# Patient Record
Sex: MALE | Race: BLACK OR AFRICAN AMERICAN | NOT HISPANIC OR LATINO | Employment: FULL TIME | URBAN - METROPOLITAN AREA
[De-identification: names, ages, dates, MRNs, and addresses within clinical notes are randomized per-mention and may not be internally consistent; named-entity substitution may affect disease eponyms.]

---

## 2017-02-02 ENCOUNTER — OFFICE VISIT (OUTPATIENT)
Dept: SURGERY | Facility: CLINIC | Age: 57
End: 2017-02-02
Payer: COMMERCIAL

## 2017-02-02 VITALS
BODY MASS INDEX: 21.19 KG/M2 | SYSTOLIC BLOOD PRESSURE: 134 MMHG | WEIGHT: 143.5 LBS | DIASTOLIC BLOOD PRESSURE: 77 MMHG | HEART RATE: 61 BPM

## 2017-02-02 DIAGNOSIS — K60.2 FISSURE IN ANO: Primary | ICD-10-CM

## 2017-02-02 PROCEDURE — 99999 PR PBB SHADOW E&M-EST. PATIENT-LVL III: CPT | Mod: PBBFAC,,, | Performed by: NURSE PRACTITIONER

## 2017-02-02 PROCEDURE — 99213 OFFICE O/P EST LOW 20 MIN: CPT | Mod: S$GLB,,, | Performed by: NURSE PRACTITIONER

## 2017-02-02 RX ORDER — HYDROCORTISONE ACETATE 25 MG/1
25 SUPPOSITORY RECTAL 2 TIMES DAILY
Qty: 20 SUPPOSITORY | Refills: 0 | Status: SHIPPED | OUTPATIENT
Start: 2017-02-02 | End: 2017-02-12

## 2017-02-02 RX ORDER — PRAMOXINE HYDROCHLORIDE HYDROCORTISONE ACETATE 100; 100 MG/10G; MG/10G
AEROSOL, FOAM TOPICAL
Refills: 2 | COMMUNITY
Start: 2017-01-17 | End: 2021-10-12

## 2017-02-02 NOTE — PATIENT INSTRUCTIONS
Anal Fissure (Child)    The anal canal is the end portion of the intestinal tract. It includes the rectum and anus. Stool is passed through the anus. Sometimes a crack or tear develops in the lining of the anal canal. This condition is called an anal fissure.  Anal fissures are caused by trauma or stretching of the anal canal. This is most often due to constipation (having hard, oedoljaub-bs-jjhn stools). Severe diarrhea or insertion of an object into the anal canal may also cause a fissure.  Symptoms include pain and bleeding, especially during a bowel movement. Sometimes there is swelling, itching, and skin irritation. The area may spasm, causing more pain and skin separation. The most common complication is infection, which may lead to an abscess (pocket of pus). When this happens, there may also be discharge from the fissure.  An anal fissure usually heals on its own with no special treatment. Home care instructions to help prevent constipation and relieve symptoms may be given. Once the area has healed, follow-up tests may be needed.  In some cases, a fissure does not heal on its own. Surgery may then be needed to close the tear.  Home care  Medicines  Your child may be prescribed medicines, such as pain relievers, stool softeners, or laxatives. Make sure to follow all instructions when giving any of these medicines to your child. Note: Do not give your child over-the-counter medicines without talking to the provider first.  General care  · To help ease constipation, you may be told to:  ¨ Increase the fiber in your childs diet. This includes foods such as whole grains, fresh fruits, and vegetables. Fiber adds bulk to stool and absorbs water to soften stool. This helps stool pass through the colon more easily. If needed, a fiber supplement may also be prescribed.  ¨ Encourage your child to drink lots of water. This can also help soften stool.  · To help relieve pain and relax the muscles in the anus, have  your child soak in a bath with a few inches of warm water. This is a called a sitz bath. Your child should do this for 10 to 15 minutes at time, a few times a day, or as advised.  · Keep a careful record of when your child has a bowel movement and the type of stool that was passed. This may help the provider determine future care for your child. Older children should be encouraged to keep their own record.  · Check your childs anus for bleeding or signs of infection (see below). Older children may be asked to help monitor their own symptoms.  Follow-up care  Follow up with your childs healthcare provider as advised. If testing was done, youll be told the results and any new findings that may affect your childs care.  When to seek medical advice  Unless your childs healthcare provider advises otherwise, call the provider right away if:  · Your child is 3 months old or younger and has a fever of 100.4°F (38°C) or higher. (Seek treatment right away. Fever in a young baby can be a sign of a serious infection.)  · Your child is younger than 2 years of age and has a fever of 100.4°F (38°C) that lasts for more than 1 day.  · Your child is 2 years old or older and has a fever of 100.4°F (38°C) that lasts for more than 3 days.  · Your child has repeated fevers above 104°F (40°C).  Also call the provider right away if:  · Your child symptoms worsen, or they dont improve with home care measures.  · Your child has signs of infection such as increased redness, swelling, or foul-smelling drainage in the area around the fissure.  · Your child has ongoing constipation or explosive diarrhea.  Call 911  Call 911 right away if:  · Your child has trouble breathing.  · Your child has trouble swallowing.  · Your child faints.  · Your child has an unusually fast heart rate.  · Your child has large amounts of bleeding from the anus or blood in the stool.  © 7283-1318 [employer]. [address] [place] [address], [place]  [address]. All rights reserved. This information is not intended as a substitute for professional medical care. Always follow your healthcare professional's instructions.

## 2017-02-02 NOTE — MR AVS SNAPSHOT
[facility]  [address]  [facility] [address]  Phone: [phone]                  Bob Trevino Jr.   2017 2:00 PM   Office Visit    Description:  Male : 1960   Provider:  Debo Lakhani NP   Department:  [facility]           Reason for Visit     Rectal Pain                To Do List           Goals (5 Years of Data)     None       These Medications        Disp Refills Start End    hydrocortisone (ANUSOL-HC) 25 mg suppository 20 suppository 0 2017    Place 1 suppository (25 mg total) rectally 2 (two) times daily. - Rectal      [facility]     [facility] Care [facility] -  Assistance  Registered nurses in the [facility] provide clinical advisement, health education, appointment booking, and other advisory services.  Call for this free service at [phone].             Medications           Message regarding Medications     Verify the changes and/or additions to your medication regime listed below are the same as discussed with your clinician today.  If any of these changes or additions are incorrect, please notify your healthcare provider.        START taking these NEW medications        Refills    hydrocortisone (ANUSOL-HC) 25 mg suppository 0    Sig: Place 1 suppository (25 mg total) rectally 2 (two) times daily.    Class: Print    Route: Rectal           Verify that the below list of medications is an accurate representation of the medications you are currently taking.  If none reported, the list may be blank. If incorrect, please contact your healthcare provider. Carry this list with you in case of emergency.           Current Medications     docusate sodium (COLACE) 100 MG capsule Take 1 capsule (100 mg total) by mouth 2 (two) times daily.    hydrocortisone (ANUSOL-HC) 25 mg suppository Place 1 suppository (25 mg total) rectally 2 (two) times daily.    hydrocortisone (PROCTOCORT) 10 % (80  mg) rectal foam Place 1 applicator rectally 2 (two) times daily.    ibuprofen (ADVIL,MOTRIN) 600 MG tablet Take 1 tablet (600 mg total) by mouth every 6 (six) hours as needed for Pain.    PROCTOFOAM HC rectal foam PLACE 1 APPLICATOR RECTALLY BID           Clinical Reference Information           Your Vitals Were     BP Pulse Weight BMI       134/77 61 65.1 kg (143 lb 8.3 oz) 21.19 kg/m2       Blood Pressure          Most Recent Value    BP  134/77      Allergies as of 2/2/2017     No Known Allergies      Immunizations Administered on Date of Encounter - 2/2/2017     None      [facility] Sign-Up     Activating your [facility] account is as easy as 1-2-3!     1) Visit [email or web address], select Sign Up Now, enter this activation code and your date of birth, then select Next.  [email or web address]  Expires: 3/19/2017  2:35 PM      2) Create a username and password to use when you visit [facility] in the future and select a security question in case you lose your password and select Next.    3) Enter your e-mail address and click Sign Up!    Additional Information  If you have questions, please e-mail [email or web address] or call [phone] to talk to our [facility] staff. Remember, [facility] is NOT to be used for urgent needs. For medical emergencies, dial 911.         Instructions      Anal Fissure (Child)    The anal canal is the end portion of the intestinal tract. It includes the rectum and anus. Stool is passed through the anus. Sometimes a crack or tear develops in the lining of the anal canal. This condition is called an anal fissure.  Anal fissures are caused by trauma or stretching of the anal canal. This is most often due to constipation (having hard, adrrrguii-ng-puql stools). Severe diarrhea or insertion of an object into the anal canal may also cause a fissure.  Symptoms include pain and bleeding, especially during a bowel movement. Sometimes there is swelling, itching, and skin irritation. The area may spasm,  causing more pain and skin separation. The most common complication is infection, which may lead to an abscess (pocket of pus). When this happens, there may also be discharge from the fissure.  An anal fissure usually heals on its own with no special treatment. Home care instructions to help prevent constipation and relieve symptoms may be given. Once the area has healed, follow-up tests may be needed.  In some cases, a fissure does not heal on its own. Surgery may then be needed to close the tear.  Home care  Medicines  Your child may be prescribed medicines, such as pain relievers, stool softeners, or laxatives. Make sure to follow all instructions when giving any of these medicines to your child. Note: Do not give your child over-the-counter medicines without talking to the provider first.  General care  · To help ease constipation, you may be told to:  ¨ Increase the fiber in your childs diet. This includes foods such as whole grains, fresh fruits, and vegetables. Fiber adds bulk to stool and absorbs water to soften stool. This helps stool pass through the colon more easily. If needed, a fiber supplement may also be prescribed.  ¨ Encourage your child to drink lots of water. This can also help soften stool.  · To help relieve pain and relax the muscles in the anus, have your child soak in a bath with a few inches of warm water. This is a called a sitz bath. Your child should do this for 10 to 15 minutes at time, a few times a day, or as advised.  · Keep a careful record of when your child has a bowel movement and the type of stool that was passed. This may help the provider determine future care for your child. Older children should be encouraged to keep their own record.  · Check your childs anus for bleeding or signs of infection (see below). Older children may be asked to help monitor their own symptoms.  Follow-up care  Follow up with your childs healthcare provider as advised. If testing was done, youll  be told the results and any new findings that may affect your childs care.  When to seek medical advice  Unless your childs healthcare provider advises otherwise, call the provider right away if:  · Your child is 3 months old or younger and has a fever of 100.4°F (38°C) or higher. (Seek treatment right away. Fever in a young baby can be a sign of a serious infection.)  · Your child is younger than 2 years of age and has a fever of 100.4°F (38°C) that lasts for more than 1 day.  · Your child is 2 years old or older and has a fever of 100.4°F (38°C) that lasts for more than 3 days.  · Your child has repeated fevers above 104°F (40°C).  Also call the provider right away if:  · Your child symptoms worsen, or they dont improve with home care measures.  · Your child has signs of infection such as increased redness, swelling, or foul-smelling drainage in the area around the fissure.  · Your child has ongoing constipation or explosive diarrhea.  Call 911  Call 911 right away if:  · Your child has trouble breathing.  · Your child has trouble swallowing.  · Your child faints.  · Your child has an unusually fast heart rate.  · Your child has large amounts of bleeding from the anus or blood in the stool.  © 2026-5537 [employer]. [address] [place] [address], [place] [address]. All rights reserved. This information is not intended as a substitute for professional medical care. Always follow your healthcare professional's instructions.             Language Assistance Services     ATTENTION: Language assistance services are available, free of charge. Please call [phone].      ATENCIÓN: Si habla español, tiene a rothman disposición servicios gratuitos de asistencia lingüística. Llame al [phone].     ANDREW Ý: N?u b?n nói Ti?ng Vi?t, có các d?ch v? h? tr? ngôn ng? mi?n phí dành cho b?n. G?i s? [phone].         [facility] complies with applicable Federal civil rights laws and does not  discriminate on the basis of race, color, national origin, age, disability, or sex.

## 2017-02-07 NOTE — PROGRESS NOTES
Patient ID:  Bob Trevino Jr. is a 56 y.o. male     Chief Complaint:   Chief Complaint   Patient presents with    Rectal Pain        HPI:  Pt presents to [facility] with complaints of rectal pain with bm for several weeks, was in ER few months ago with similar complaint and the pain did resolve, normally has hard stools every other day, neve had colonoscopy, no hx of colon or rectal surgery and no hx of colon rectal cancer       ROS:        Constitutional: No fever, chills, activity or appetite change.      HENT: No hearing loss, facial swelling, neck pain or stiffness.       Eyes: No discharge, itching and visual disturbance.      Respiratory: No apnea, cough, choking or shortness of breath.       Cardiovascular: No leg swelling or chest pain      Gastrointestinal: No abdominal distention or change in bowel habbits     Genitourinary: No dysuria, frequency or flank pain.      Musculoskeletal: No arthralgias or gait problem.      Neurological: No dizziness, seizures or weakness.      Hematological: No adenopathy.      Psychiatric/Behavioral: No hallucinations or behavioral problems.       PE:    APPEARANCE: Well nourished, well developed, in no acute distress.   CHEST: Lungs clear. Normal respiratory effort.  CARDIOVASCULAR: Normal rhythm. No edema.  ABDOMEN: Soft. No tenderness or masses.  Rectum:  Careful eversion of anus revealed posterior anal fissure inside of anus     Musculoskeletal: Symmetric, normal range of motion and strength.   Neurological: Alert and oriented to person, place, and time. Normal reflexes.   Skin: Skin is warm and dry.   Psychiatric: Normal mood and affect. Behavior is normal and appropriate.     Impression:    fissure    PLAN:    ·  Sitz baths (water hot as tolerable, 10-20min, 4-5x A day)  ·  Stool softeners (metamucil, citrucel, konsyl, fibercon, benefiber)  ·  Miralax if stool is still hard  ·  Increase dietary water intake  ·  Increase exercise or ambulation  If no better 2-3 weeks made fu  appt

## 2021-01-25 ENCOUNTER — CLINICAL SUPPORT (OUTPATIENT)
Dept: URGENT CARE | Facility: CLINIC | Age: 61
End: 2021-01-25
Payer: COMMERCIAL

## 2021-01-25 DIAGNOSIS — Z20.822 EXPOSURE TO COVID-19 VIRUS: Primary | ICD-10-CM

## 2021-01-25 LAB
CTP QC/QA: YES
SARS-COV-2 RDRP RESP QL NAA+PROBE: NEGATIVE

## 2021-01-25 PROCEDURE — U0002 COVID-19 LAB TEST NON-CDC: HCPCS | Mod: QW,S$GLB,, | Performed by: FAMILY MEDICINE

## 2021-01-25 PROCEDURE — U0002: ICD-10-PCS | Mod: QW,S$GLB,, | Performed by: FAMILY MEDICINE

## 2021-04-26 ENCOUNTER — PATIENT MESSAGE (OUTPATIENT)
Dept: RESEARCH | Facility: HOSPITAL | Age: 61
End: 2021-04-26

## 2021-10-12 ENCOUNTER — OFFICE VISIT (OUTPATIENT)
Dept: PRIMARY CARE CLINIC | Facility: CLINIC | Age: 61
End: 2021-10-12
Payer: COMMERCIAL

## 2021-10-12 VITALS
DIASTOLIC BLOOD PRESSURE: 76 MMHG | WEIGHT: 136 LBS | OXYGEN SATURATION: 98 % | HEART RATE: 70 BPM | BODY MASS INDEX: 20.09 KG/M2 | SYSTOLIC BLOOD PRESSURE: 122 MMHG

## 2021-10-12 DIAGNOSIS — R10.9 ABDOMINAL PAIN, UNSPECIFIED ABDOMINAL LOCATION: Primary | ICD-10-CM

## 2021-10-12 PROCEDURE — 1160F PR REVIEW ALL MEDS BY PRESCRIBER/CLIN PHARMACIST DOCUMENTED: ICD-10-PCS | Mod: CPTII,S$GLB,, | Performed by: FAMILY MEDICINE

## 2021-10-12 PROCEDURE — 3074F PR MOST RECENT SYSTOLIC BLOOD PRESSURE < 130 MM HG: ICD-10-PCS | Mod: CPTII,S$GLB,, | Performed by: FAMILY MEDICINE

## 2021-10-12 PROCEDURE — 1159F MED LIST DOCD IN RCRD: CPT | Mod: CPTII,S$GLB,, | Performed by: FAMILY MEDICINE

## 2021-10-12 PROCEDURE — 99203 OFFICE O/P NEW LOW 30 MIN: CPT | Mod: S$GLB,,, | Performed by: FAMILY MEDICINE

## 2021-10-12 PROCEDURE — 99203 PR OFFICE/OUTPT VISIT, NEW, LEVL III, 30-44 MIN: ICD-10-PCS | Mod: S$GLB,,, | Performed by: FAMILY MEDICINE

## 2021-10-12 PROCEDURE — 3078F DIAST BP <80 MM HG: CPT | Mod: CPTII,S$GLB,, | Performed by: FAMILY MEDICINE

## 2021-10-12 PROCEDURE — 3008F PR BODY MASS INDEX (BMI) DOCUMENTED: ICD-10-PCS | Mod: CPTII,S$GLB,, | Performed by: FAMILY MEDICINE

## 2021-10-12 PROCEDURE — 1159F PR MEDICATION LIST DOCUMENTED IN MEDICAL RECORD: ICD-10-PCS | Mod: CPTII,S$GLB,, | Performed by: FAMILY MEDICINE

## 2021-10-12 PROCEDURE — 99999 PR PBB SHADOW E&M-EST. PATIENT-LVL III: CPT | Mod: PBBFAC,,, | Performed by: FAMILY MEDICINE

## 2021-10-12 PROCEDURE — 3078F PR MOST RECENT DIASTOLIC BLOOD PRESSURE < 80 MM HG: ICD-10-PCS | Mod: CPTII,S$GLB,, | Performed by: FAMILY MEDICINE

## 2021-10-12 PROCEDURE — 3008F BODY MASS INDEX DOCD: CPT | Mod: CPTII,S$GLB,, | Performed by: FAMILY MEDICINE

## 2021-10-12 PROCEDURE — 99999 PR PBB SHADOW E&M-EST. PATIENT-LVL III: ICD-10-PCS | Mod: PBBFAC,,, | Performed by: FAMILY MEDICINE

## 2021-10-12 PROCEDURE — 3074F SYST BP LT 130 MM HG: CPT | Mod: CPTII,S$GLB,, | Performed by: FAMILY MEDICINE

## 2021-10-12 PROCEDURE — 1160F RVW MEDS BY RX/DR IN RCRD: CPT | Mod: CPTII,S$GLB,, | Performed by: FAMILY MEDICINE

## 2021-10-12 RX ORDER — TIZANIDINE 2 MG/1
4 TABLET ORAL EVERY 6 HOURS PRN
Qty: 20 TABLET | Refills: 1 | Status: SHIPPED | OUTPATIENT
Start: 2021-10-12 | End: 2021-10-22

## 2022-10-07 ENCOUNTER — PATIENT MESSAGE (OUTPATIENT)
Dept: PRIMARY CARE CLINIC | Facility: CLINIC | Age: 62
End: 2022-10-07
Payer: COMMERCIAL

## 2022-10-26 ENCOUNTER — PATIENT MESSAGE (OUTPATIENT)
Dept: PRIMARY CARE CLINIC | Facility: CLINIC | Age: 62
End: 2022-10-26
Payer: COMMERCIAL

## 2023-02-15 ENCOUNTER — TELEPHONE (OUTPATIENT)
Dept: GASTROENTEROLOGY | Facility: CLINIC | Age: 63
End: 2023-02-15
Payer: COMMERCIAL

## 2023-02-15 NOTE — TELEPHONE ENCOUNTER
I spoke with the patient's wife and gave her Dr Harden's name to see if she can get him appt with someone.

## 2023-02-24 ENCOUNTER — TELEPHONE (OUTPATIENT)
Dept: GASTROENTEROLOGY | Facility: CLINIC | Age: 63
End: 2023-02-24
Payer: COMMERCIAL

## 2023-02-24 ENCOUNTER — OFFICE VISIT (OUTPATIENT)
Dept: GASTROENTEROLOGY | Facility: CLINIC | Age: 63
End: 2023-02-24
Payer: COMMERCIAL

## 2023-02-24 ENCOUNTER — HOSPITAL ENCOUNTER (OUTPATIENT)
Dept: RADIOLOGY | Facility: HOSPITAL | Age: 63
Discharge: HOME | End: 2023-02-24
Payer: COMMERCIAL

## 2023-02-24 ENCOUNTER — PATIENT MESSAGE (OUTPATIENT)
Dept: GASTROENTEROLOGY | Facility: CLINIC | Age: 63
End: 2023-02-24
Payer: COMMERCIAL

## 2023-02-24 VITALS — BODY MASS INDEX: 19.73 KG/M2 | WEIGHT: 133.63 LBS

## 2023-02-24 DIAGNOSIS — R10.11 RIGHT UPPER QUADRANT ABDOMINAL PAIN: Primary | ICD-10-CM

## 2023-02-24 DIAGNOSIS — Z12.11 COLON CANCER SCREENING: ICD-10-CM

## 2023-02-24 DIAGNOSIS — R10.11 RIGHT UPPER QUADRANT ABDOMINAL PAIN: ICD-10-CM

## 2023-02-24 PROCEDURE — 99204 PR OFFICE/OUTPT VISIT, NEW, LEVL IV, 45-59 MIN: ICD-10-PCS | Mod: S$GLB,,,

## 2023-02-24 PROCEDURE — 3008F BODY MASS INDEX DOCD: CPT | Mod: CPTII,S$GLB,,

## 2023-02-24 PROCEDURE — 74019 RADEX ABDOMEN 2 VIEWS: CPT | Mod: 26,,, | Performed by: RADIOLOGY

## 2023-02-24 PROCEDURE — 1159F MED LIST DOCD IN RCRD: CPT | Mod: CPTII,S$GLB,,

## 2023-02-24 PROCEDURE — 99204 OFFICE O/P NEW MOD 45 MIN: CPT | Mod: S$GLB,,,

## 2023-02-24 PROCEDURE — 74019 RADEX ABDOMEN 2 VIEWS: CPT | Mod: TC,FY

## 2023-02-24 PROCEDURE — 99999 PR PBB SHADOW E&M-EST. PATIENT-LVL III: CPT | Mod: PBBFAC,,,

## 2023-02-24 PROCEDURE — 74019 XR ABDOMEN FLAT AND ERECT: ICD-10-PCS | Mod: 26,,, | Performed by: RADIOLOGY

## 2023-02-24 PROCEDURE — 1159F PR MEDICATION LIST DOCUMENTED IN MEDICAL RECORD: ICD-10-PCS | Mod: CPTII,S$GLB,,

## 2023-02-24 PROCEDURE — 99999 PR PBB SHADOW E&M-EST. PATIENT-LVL III: ICD-10-PCS | Mod: PBBFAC,,,

## 2023-02-24 PROCEDURE — 3008F PR BODY MASS INDEX (BMI) DOCUMENTED: ICD-10-PCS | Mod: CPTII,S$GLB,,

## 2023-02-24 RX ORDER — POLYETHYLENE GLYCOL 3350, SODIUM SULFATE, SODIUM CHLORIDE, POTASSIUM CHLORIDE, SODIUM ASCORBATE, AND ASCORBIC ACID 7.5-2.691G
2000 KIT ORAL ONCE
Qty: 1 EACH | Refills: 0 | Status: SHIPPED | OUTPATIENT
Start: 2023-02-24 | End: 2023-02-24

## 2023-02-24 NOTE — TELEPHONE ENCOUNTER
Patient scheduled for 4/5/2023 and I sent the moviprep through the patient's my chart.        ----- Message from Alyssa Cardoza NP sent at 2/24/2023  1:26 PM CST -----  Luis Cody,     Saw this patient in clinic today- can you get him scheduled for a colonoscopy over at [facility]??    Prep is moviprep    Thanks!

## 2023-02-24 NOTE — PROGRESS NOTES
"     GASTROENTEROLOGY CLINIC NOTE    Reason for visit: The primary encounter diagnosis was Right upper quadrant abdominal pain. A diagnosis of Colon cancer screening was also pertinent to this visit.  Referring provider/PCP: Primary Doctor No    HPI:  Bob Trevino Jr. is a 62 y.o. male here today for abdominal pain, he is accompanied by his wife. This has been ongoing for a few years now, occurs almost daily but comes and goes throughout the day. He had an US in 2021 which showed potential sludge, no stones or infection of gallbladder. He notes pain isnt worsesned with mvoement or eating. He states pain is sharp like "getting punched", he has assciated bloating and flatulence. Having a BM relieves pain. He endorses having a BM daily, but not always an effective BM. He denies blood/black stool. He notes he has lost weight, upon chart review about 10 lbs since 2017. He has tried peptobismol, gas-ex, and baking soda/water without relief. No previous colonoscopy. No family history of CRC or IBD.     Prior Endoscopy: N/A  EGD:  Colon:    (Portions of this note were dictated using voice recognition software and may contain dictation related errors in spelling/grammar/syntax not found on text review)    Review of Systems   Constitutional:  Positive for weight loss.   Gastrointestinal:  Positive for abdominal pain. Negative for blood in stool, heartburn and nausea.     Past Medical History: has no past medical history on file.    Past Surgical History: has no past surgical history on file.    Home medications:   No current outpatient medications on file prior to visit.     No current facility-administered medications on file prior to visit.       Vital signs:  Wt 60.6 kg (133 lb 9.6 oz)   BMI 19.73 kg/m²     Physical Exam  Constitutional:       Appearance: Normal appearance. He is normal weight.   Abdominal:      General: Abdomen is flat.      Palpations: Abdomen is soft.      Tenderness: There is no abdominal tenderness. "   Neurological:      Mental Status: He is alert.       I have reviewed associated labs, imaging and notes.     Assessment:  1. Right upper quadrant abdominal pain    2. Colon cancer screening    RUQ pain   Ongoing for many years, associated bloating   Abd US last year showed possible gallbladder sludge, negative for stones/infection   No change in pain with mvmt or eating   Relief with having a BM   Sounds more like incomplete emptying/possible stool burden- will check xray   Do not think this is gallbladder related, could consider CT if colonoscopy and xray are unrevealing   Colon cancer screening   No previous screening modality   No family history     Plan:  Orders Placed This Encounter    X-Ray Abdomen Flat And Erect    polyethylene glycol (MOVIPREP) 100-7.5-2.691 gram solution    Case Request Endoscopy: COLONOSCOPY     Check xray for stool burden   Metamucil vs miralax depending on results    Schedule colonoscopy at [place]   moviprep    F/U if symptoms worsen or persist    I have explained the procedure to the patient and his wife. Patient verbalized understanding and all questions were answered. Patient in agreement to proceed as planned.     Alyssa Cardoza NP  [facility]

## 2023-02-27 ENCOUNTER — TELEPHONE (OUTPATIENT)
Dept: GASTROENTEROLOGY | Facility: CLINIC | Age: 63
End: 2023-02-27
Payer: COMMERCIAL

## 2023-02-27 ENCOUNTER — PATIENT MESSAGE (OUTPATIENT)
Dept: GASTROENTEROLOGY | Facility: CLINIC | Age: 63
End: 2023-02-27
Payer: COMMERCIAL

## 2023-02-27 NOTE — TELEPHONE ENCOUNTER
----- Message from Alyssa Cardoza NP sent at 2/27/2023  7:56 AM CST -----  Please let pt know xray showed some stool on the right side. Start taking 1 capful of miralax daily, can increase or decrease as needed.

## 2023-07-06 ENCOUNTER — TELEPHONE (OUTPATIENT)
Dept: SURGERY | Facility: CLINIC | Age: 63
End: 2023-07-06
Payer: COMMERCIAL

## 2023-07-06 ENCOUNTER — PATIENT MESSAGE (OUTPATIENT)
Dept: SURGERY | Facility: CLINIC | Age: 63
End: 2023-07-06
Payer: COMMERCIAL

## 2023-07-06 RX ORDER — SODIUM, POTASSIUM,MAG SULFATES 17.5-3.13G
1 SOLUTION, RECONSTITUTED, ORAL ORAL DAILY
Qty: 1 KIT | Refills: 0 | Status: SHIPPED | OUTPATIENT
Start: 2023-07-06 | End: 2023-07-08

## 2023-07-06 NOTE — TELEPHONE ENCOUNTER
The patient has been advised the Colonoscopy Prep Kit will be ordered from the patient's verified preferred pharmacy on file. The medication can  be picked up by the patient, or the patient's designated representative.The patient was further explained the Colonoscopy Prep instructions will be mailed to the patient verified mailing address on file, or put onto the [employer] portal, whichever method of delivery the patient prefers.Additionally this patient was informed,not to follow the instructions that comes with the bowel prep medication. However, the patient was instructed to please follow the Colonoscopy Bowel Prep instructions that's being provided by the . The patient was asked to please to follow the Colonoscopy Prep instructions being provided as precisely,and  meticulously as possible.The patient was advised you  will receive a follow up phone call to summarize the Colonoscopy Prep instructions prior to the scheduled Colonoscopy procedure date. At this time the patient will be given an opportunity to ask any questions regarding the Colonoscopy procedure, and it's associated Bowel Prep instructions.

## 2023-07-06 NOTE — TELEPHONE ENCOUNTER
Called patient in reference to a referral to Colorectal Surgery for colon cancer screening. Patient verbally consented to a Colonoscopy and requested to be scheduled for a Colonoscopy on 08/07/2023 Patient was advised a designated  is required on the day of the Colonoscopy to drive the patient home and the  must be at least. 18 years old.Colonoscopy Prep instructions were thoroughly explained and discussed with the patient.It was emphasized, and reiterated to the patient, to please not to follow the bowel prep instructions that comes with the bowel prep package.However, to please follow the prep instructions that will be received in the mail,or via the [employer] portal, or by both modes of delivery, which ever method of delivery the patient prefers,from the [facility]   Patient acknowledges understanding Prep instructions as explained and discussed on the phone.. Patient was advised the Colonoscopy Prep instructions discussed and explained on the phone,are being mailed out to the patient's verified address on file,or put onto the [employer] portal,or both methods of delivery, whichever the patient prefers. Patient's address on file was verified with the patient for accuracy of mailing. Patient's medications on file was reviewed with the patient for accuracy of information. Patient denies taking  any other medications other than those listed and verified on medication profile.Patient was explained the Colonoscopy will be performed here at [facility]. Patient was further explained the Pre-Op will call one day prior to the procedure date, to discuss Pre-Op instructions;and what time to report for the Colonoscopy. The patient was given the opportunity to ask any questions about the Colonoscopy. No further issues were discussed.

## 2023-08-11 ENCOUNTER — TELEPHONE (OUTPATIENT)
Dept: GASTROENTEROLOGY | Facility: CLINIC | Age: 63
End: 2023-08-11
Payer: COMMERCIAL

## 2023-08-11 NOTE — TELEPHONE ENCOUNTER
Left VM for patient to call the office back.       ----- Message from Katerine Hopper sent at 8/11/2023 10:10 AM CDT -----  Type:  Needs Medical Advice    Who Called: pt    Would the patient rather a call back or a response via [facility]? call  Best Call Back Number: [phone]  Additional Information: pt requesting a call back to discuss colonoscopy results would also like to discuss having another procedure.

## 2023-09-22 ENCOUNTER — TELEPHONE (OUTPATIENT)
Dept: GASTROENTEROLOGY | Facility: CLINIC | Age: 63
End: 2023-09-22
Payer: COMMERCIAL

## 2023-09-22 ENCOUNTER — PATIENT MESSAGE (OUTPATIENT)
Dept: PRIMARY CARE CLINIC | Facility: CLINIC | Age: 63
End: 2023-09-22
Payer: COMMERCIAL

## 2023-09-22 NOTE — TELEPHONE ENCOUNTER
Left VM for patient to call the office back.         ----- Message from Heaven Joe sent at 9/22/2023  8:57 AM CDT -----  Regarding: Medical Assistance  Contact: Patient  Patient is requesting a call back in reference to current medical condition   Patient stated medication prescribed is not helping with symptoms and he would like to discuss changing medication   Please Assist     Patient can be reached at [phone]

## 2023-09-27 ENCOUNTER — OFFICE VISIT (OUTPATIENT)
Dept: PRIMARY CARE CLINIC | Facility: CLINIC | Age: 63
End: 2023-09-27
Payer: COMMERCIAL

## 2023-09-27 DIAGNOSIS — R10.9 CHRONIC ABDOMINAL PAIN: Primary | ICD-10-CM

## 2023-09-27 DIAGNOSIS — G89.29 CHRONIC ABDOMINAL PAIN: Primary | ICD-10-CM

## 2023-09-27 PROCEDURE — 99214 PR OFFICE/OUTPT VISIT, EST, LEVL IV, 30-39 MIN: ICD-10-PCS | Mod: 95,,, | Performed by: FAMILY MEDICINE

## 2023-09-27 PROCEDURE — 99214 OFFICE O/P EST MOD 30 MIN: CPT | Mod: 95,,, | Performed by: FAMILY MEDICINE

## 2023-09-27 NOTE — PROGRESS NOTES
The patient location is: work  The chief complaint leading to consultation is: abdominal pain    Visit type: audiovisual    Face to Face time with patient: 15 minutes of total time spent on the encounter, which includes face to face time and non-face to face time preparing to see the patient (eg, review of tests), Obtaining and/or reviewing separately obtained history, Documenting clinical information in the electronic or other health record, Independently interpreting results (not separately reported) and communicating results to the patient/family/caregiver, or Care coordination (not separately reported).         Each patient to whom he or she provides medical services by telemedicine is:  (1) informed of the relationship between the physician and patient and the respective role of any other health care provider with respect to management of the patient; and (2) notified that he or she may decline to receive medical services by telemedicine and may withdraw from such care at any time.    Notes:       Clinic Note  9/27/2023      Subjective:       Patient ID:  Bob is a 62 y.o. male being seen for an established visit.    Chief Complaint:  Abdominal pain    Abdominal pain-patient still with right upper quadrant abdominal pain present for several years.  Previous abdominal ultrasound showing possible gallbladder sludge, negative for stones or infection.  Patient had a colonoscopy last month which was unremarkable.  Patient reports abdominal pain resolved right after the colonoscopy but has since recurred again.  Reports abdominal pain present almost every day, feels like a sensation of a lot of pressure and needing to belching.  Patient has tried to make significant changes to her diet to see if it makes a difference including cutting down spicy foods, eating caffeine.  Food does not affect his symptoms.  Has tried many different medications including antacids, muscle relaxers, topical Aspercreme without any  "improvement of his symptoms.  Denies nausea, vomiting, diarrhea, constipation, blood in stool.  Patient's current weight is 134 which is about stable, though patient's weight fluctuates        Review of Systems   Constitutional:  Negative for chills, fever, malaise/fatigue and weight loss.   HENT:  Negative for congestion, sinus pain and sore throat.    Respiratory:  Negative for cough, shortness of breath and wheezing.    Cardiovascular:  Negative for chest pain and palpitations.   Gastrointestinal:  Positive for abdominal pain. Negative for blood in stool, constipation, diarrhea, heartburn, melena, nausea and vomiting.   Genitourinary:  Negative for dysuria, frequency and urgency.   Musculoskeletal:  Negative for myalgias.   Skin:  Negative for rash.   Neurological:  Negative for headaches.           There is no problem list on file for this patient.          Objective:      There were no vitals taken for this visit.  Estimated body mass index is 19.84 kg/m² as calculated from the following:    Height as of 8/7/23: 5' 9" (1.753 m).    Weight as of 8/7/23: 61 kg (134 lb 5.9 oz).  Physical Exam  Constitutional:       General: He is not in acute distress.     Appearance: He is not diaphoretic.   HENT:      Head: Normocephalic and atraumatic.   Eyes:      Conjunctiva/sclera: Conjunctivae normal.   Pulmonary:      Effort: Pulmonary effort is normal.   Abdominal:       Musculoskeletal:         General: Normal range of motion.   Neurological:      Mental Status: He is alert and oriented to person, place, and time.   Psychiatric:         Mood and Affect: Mood and affect normal.         Behavior: Behavior normal.         Thought Content: Thought content normal.         Cognition and Memory: Memory normal.         Judgment: Judgment normal.           Assessment and Plan:     1. Chronic abdominal pain  - unclear etiology of patient's chronic abdominal pain ongoing for several years, previous abdominal ultrasound just showing " gallbladder sludge and unremarkable colonoscopy.  Will obtain CT abdomen and pelvis for further evaluation, in the future may consider HIDA scan for biliary disease.  Refer back to gastroenterology for possible EGD  - CT Abdomen Pelvis W Wo Contrast; Future  - H. pylori antigen, stool; Future  - Ambulatory referral/consult to Gastroenterology; Future  - Creatinine, serum; Future      Follow Up:   No follow-ups on file.    Other Orders Placed This Visit:  Orders Placed This Encounter   Procedures    CT Abdomen Pelvis W Wo Contrast    H. pylori antigen, stool    Creatinine, serum    Ambulatory referral/consult to Gastroenterology         Ganesh Asif MD        This note is dictated on M*Modal word recognition program.  There are word recognition mistakes that are occasionally missed on review.

## 2023-10-24 ENCOUNTER — LAB VISIT (OUTPATIENT)
Dept: PRIMARY CARE CLINIC | Facility: CLINIC | Age: 63
End: 2023-10-24
Payer: COMMERCIAL

## 2023-10-24 ENCOUNTER — OFFICE VISIT (OUTPATIENT)
Dept: GASTROENTEROLOGY | Facility: CLINIC | Age: 63
End: 2023-10-24
Payer: COMMERCIAL

## 2023-10-24 DIAGNOSIS — R10.11 RUQ ABDOMINAL PAIN: Primary | ICD-10-CM

## 2023-10-24 DIAGNOSIS — R14.2 BELCHING: ICD-10-CM

## 2023-10-24 DIAGNOSIS — K21.9 GASTROESOPHAGEAL REFLUX DISEASE, UNSPECIFIED WHETHER ESOPHAGITIS PRESENT: ICD-10-CM

## 2023-10-24 DIAGNOSIS — R10.9 CHRONIC ABDOMINAL PAIN: ICD-10-CM

## 2023-10-24 DIAGNOSIS — G89.29 CHRONIC ABDOMINAL PAIN: ICD-10-CM

## 2023-10-24 PROCEDURE — 99214 PR OFFICE/OUTPT VISIT, EST, LEVL IV, 30-39 MIN: ICD-10-PCS | Mod: 95,,,

## 2023-10-24 PROCEDURE — 87338 HPYLORI STOOL AG IA: CPT | Performed by: FAMILY MEDICINE

## 2023-10-24 PROCEDURE — 99214 OFFICE O/P EST MOD 30 MIN: CPT | Mod: 95,,,

## 2023-10-24 RX ORDER — PANTOPRAZOLE SODIUM 40 MG/1
40 TABLET, DELAYED RELEASE ORAL DAILY
Qty: 30 TABLET | Refills: 11 | Status: SHIPPED | OUTPATIENT
Start: 2023-10-24 | End: 2024-10-23

## 2023-10-24 NOTE — PROGRESS NOTES
The patient location is: Patient Home   The chief complaint leading to consultation is: RUQ Pain  Visit type: Virtual visit with synchronous audio and video  Total time spent with patient: 17 minutes    Each patient to whom he or she provides medical services by telemedicine is:  (1) informed of the relationship between the physician and patient and the respective role of any other health care provider with respect to management of the patient; and (2) notified that he or she may decline to receive medical services by telemedicine and may withdraw from such care at any time.                                                                               Gastroenterology Progress Note    Reason for Visit:  The primary encounter diagnosis was RUQ abdominal pain. Diagnoses of Gastroesophageal reflux disease, unspecified whether esophagitis present and Belching were also pertinent to this visit.    PCP:   No, Primary Doctor   [id number] / [place] [id number]      Initial HPI   This is a 62 y.o. male presenting for multiple GI complaints. LOV on 2/24/2023 with SHIV Cardoza in [place] for similar complaints. Ordered a colonoscopy (report below).   Patient complaining of chronic RUQ abdominal pain that dates back to around the pandemic (2020). Patient states his pain is right under his ribs and feels like a joggers cramp. Typically not associated with eating. Feels better if he manually compresses the area. Does have days where he feels like he is improving and then this pain will reoccur. Denies any trauma, falls, injuries, hematemesis, nausea, vomiting associated with this pain. Not taking anything OTC.   Worsening belching with some reflux symptoms. Will take Pepto-bismol that he feels coats his stomach. Not taking any PPI over the counter. Does feel like if he eats spicy, greasy foods his GERD symptoms are worst. Denies hematemesis, nausea, vomiting, diarrhea or constipation  Patient has reported kevon subjective weight  loss. States that his weight has been fluctuating between 134-136, but does not feel like his weight is reflective of his diet. Unable to weigh patient.   Reports his bowel habits have been relatively normal. Denies blood in stool, nausea, vomiting, constipation, diarrhea. Patient states that he felt completely normal for several days after his colonoscopy after the prep was completed. Denies any constipation as he has bowel movements daily that are typically easy to pass.       ROS:  Review of Systems   Constitutional:  Positive for weight loss. Negative for chills, diaphoresis, fever and malaise/fatigue.   HENT:  Negative for sore throat.    Eyes:  Negative for redness.   Gastrointestinal:  Positive for abdominal pain and heartburn. Negative for blood in stool, constipation, diarrhea, melena, nausea and vomiting.   Skin:  Negative for itching and rash.   Neurological:  Negative for dizziness, loss of consciousness and weakness.        Medical History:  has no past medical history on file.    Surgical History:  has a past surgical history that includes Colonoscopy (N/A, 8/7/2023).    Family History: family history is not on file..       Review of patient's allergies indicates:  No Known Allergies    No current outpatient medications on file prior to visit.     Current Facility-Administered Medications on File Prior to Visit   Medication Dose Route Frequency Provider Last Rate Last Admin    0.9%  NaCl infusion   Intravenous Continuous Sly Bhatt MD        LIDOcaine (PF) 10 mg/ml (1%) injection 10 mg  1 mL Intradermal Once PRN Sly Bhatt MD             Objective Findings:    Vital Signs:  There were no vitals taken for this visit.  There is no height or weight on file to calculate BMI.    Physical Exam: Limited due to virtual visit  Physical Exam  Vitals reviewed.   Neurological:      Mental Status: He is alert and oriented to person, place, and time.             Labs:  No results found  "for: "WBC", "HGB", "HCT", "PLT", "CRP", "CHOL", "TRIG", "HDL", "LDLDIRECT", "ALKPHOS", "LIPASE", "ALT", "AST", "NA", "K", "CL", "CREATININE", "BUN", "CO2", "TSH", "PSA", "INR", "GLUF", "HGBA1C", "MICROALBUR"    No results found for: "CDIFFICILEAN"  No results found for: "CDIFFTOX"    Imaging reviewed: US Abdomen 10/12/2021  Impression:     Possible very minimal amount of sludge.  No definite acute inflammatory changes of the gallbladder.  If symptoms persist consider HIDA scan.        Electronically signed by: Jazzy Phillips MD  Date:                                            10/12/2021  Time:                                           16:12      Endoscopy reviewed: Colonoscopy 8/7/2023  Impression:            - Two 3 to 4 mm polyps in the transverse colon and                          at the hepatic flexure, removed with a cold snare.                          Resected and retrieved.                          - Internal hemorrhoids.   Recommendation:        - Discharge patient to home.                          - Resume previous diet.                          - Continue present medications.                          - Await pathology results.                          - Repeat colonoscopy in 5 years for surveillance.                          - Patient has a contact number available for                          emergencies. The signs and symptoms of potential                          delayed complications were discussed with the                          patient. Return to normal activities tomorrow.                          Written discharge instructions were provided to                          the patient.   Sly Bhatt MD   8/7/2023 2:03:16 PM     Assessment:  1. RUQ abdominal pain    2. Gastroesophageal reflux disease, unspecified whether esophagitis present    3. Belching             Recommendations:  Will order RUQ US to reevalaute his gallbladder as contributing to his symptoms. CT Abdomen " ordered by his PCP. Will help schedule to complete.  Recommended completing H. Pylori stool testing as already ordered. Started patient on PPI once daily. EGD referral placed. Will plan on upper endoscopy for further evaluation pending findings of his CT And US. If gall bladder pathology, can consider surgery referral vs HIDA scan.  PPI once daily and H. Pylori testing.   RTC pending workup      Thank you for allowing me to participate in this patient's care.    Sincerely,     Ivanna López NP  Gastroenterology Department  [facility]

## 2023-10-24 NOTE — PATIENT INSTRUCTIONS
For GERD/Reflux:     Take your PPI 30-45 minutes before your first protein containing meal (breakfast) every day. Take once daily for 8 weeks. If symptoms improve ok discontinue an use PPI as needed for symptoms.      Take Pepcid 20mg every evening before bedtime to help with nocturnal symptoms, as needed.     Remain upright for at least 3 hours after eating.      Elevate the head of the bed for nighttime.      Avoid foods that you have noticed make your symptoms worse (possible triggers include: peppermint, alcohol, chocolate, caffeine, spicy foods, greasy/fried foods, acidic foods-citrus).    --Send in H. Pylori stool test and then you can begin taking your PPI  --RUQ US to be scheduled  --CT Abdomen to be scheduled  --EGD to be scheduled

## 2023-10-25 ENCOUNTER — TELEPHONE (OUTPATIENT)
Dept: ENDOSCOPY | Facility: HOSPITAL | Age: 63
End: 2023-10-25
Payer: COMMERCIAL

## 2023-10-25 ENCOUNTER — HOSPITAL ENCOUNTER (OUTPATIENT)
Dept: RADIOLOGY | Facility: HOSPITAL | Age: 63
Discharge: HOME | End: 2023-10-25
Payer: COMMERCIAL

## 2023-10-25 VITALS — WEIGHT: 140 LBS | BODY MASS INDEX: 20.73 KG/M2 | HEIGHT: 69 IN

## 2023-10-25 DIAGNOSIS — R10.11 RUQ ABDOMINAL PAIN: ICD-10-CM

## 2023-10-25 DIAGNOSIS — K21.9 GASTROESOPHAGEAL REFLUX DISEASE, UNSPECIFIED WHETHER ESOPHAGITIS PRESENT: ICD-10-CM

## 2023-10-25 DIAGNOSIS — R63.4 WEIGHT LOSS: ICD-10-CM

## 2023-10-25 DIAGNOSIS — R10.9 ABDOMINAL PAIN, UNSPECIFIED ABDOMINAL LOCATION: Primary | ICD-10-CM

## 2023-10-25 PROCEDURE — 76705 ECHO EXAM OF ABDOMEN: CPT | Mod: TC

## 2023-10-25 PROCEDURE — 76705 US ABDOMEN LIMITED: ICD-10-PCS | Mod: 26,,, | Performed by: RADIOLOGY

## 2023-10-25 PROCEDURE — 76705 ECHO EXAM OF ABDOMEN: CPT | Mod: 26,,, | Performed by: RADIOLOGY

## 2023-10-25 NOTE — TELEPHONE ENCOUNTER
"----- Message from Dayna Coelho sent at 10/24/2023  3:17 PM CDT -----  Regarding: FW: EGD    ----- Message -----  From: Ivanna López NP  Sent: 10/24/2023  10:36 AM CDT  To: [facility]  Subject: EGD                                              Procedure: EGD    Diagnosis: Abdominal pain, GERD, and Weight loss    Procedure Timin-12 weeks    #If within 4 weeks selected, please celina as high priority#    #If greater than 12 weeks, please select "5-12 weeks" and delay sending until 3 months prior to requested date#     Provider: Any GI provider    Location: [facility] 2-Endo and [facility] 4-Endo    Additional Scheduling Information: No scheduling concerns    Prep Specifications:N/A    Is the patient taking a GLP-1 Agonist:no    Have you attached a patient to this message: no        "

## 2023-10-25 NOTE — TELEPHONE ENCOUNTER
Spoke to patient to schedule procedure(s) Upper Endoscopy (EGD)       Physician to perform procedure(s) Dr. AGUILA Kahn  Date of Procedure (s) 1/26/24  Arrival Time 11:30 AM  Time of Procedure(s) 12:30 PM   Location of Procedure(s) [address]  Type of Rx Prep sent to patient: N/A  Instructions provided to patient via [facility]    Patient was informed on the following information and verbalized understanding. Screening questionnaire reviewed with patient and complete. If procedure requires anesthesia, a responsible adult needs to be present to accompany the patient home, patient cannot drive after receiving anesthesia. Appointment details are tentative, especially check-in time. Patient will receive a prep-op call 4 days prior to confirm check-in time for procedure. If applicable the patient should contact their pharmacy to verify Rx for procedure prep is ready for pick-up. Patient was advised to call the scheduling department at [phone] if pharmacy states no Rx is available. Patient was advised to call the endoscopy scheduling department if any questions or concerns arise.      [facility] Scheduling Department

## 2023-11-02 LAB — H PYLORI AG STL QL IA: NOT DETECTED

## 2024-01-19 ENCOUNTER — TELEPHONE (OUTPATIENT)
Dept: ENDOSCOPY | Facility: HOSPITAL | Age: 64
End: 2024-01-19
Payer: COMMERCIAL

## 2024-01-25 ENCOUNTER — PATIENT MESSAGE (OUTPATIENT)
Dept: ENDOSCOPY | Facility: HOSPITAL | Age: 64
End: 2024-01-25
Payer: COMMERCIAL

## 2024-01-26 ENCOUNTER — HOSPITAL ENCOUNTER (OUTPATIENT)
Facility: HOSPITAL | Age: 64
Discharge: HOME | End: 2024-01-26
Attending: INTERNAL MEDICINE | Admitting: INTERNAL MEDICINE
Payer: COMMERCIAL

## 2024-01-26 ENCOUNTER — ANESTHESIA (OUTPATIENT)
Dept: ENDOSCOPY | Facility: HOSPITAL | Age: 64
End: 2024-01-26
Payer: COMMERCIAL

## 2024-01-26 ENCOUNTER — ANESTHESIA EVENT (OUTPATIENT)
Dept: ENDOSCOPY | Facility: HOSPITAL | Age: 64
End: 2024-01-26
Payer: COMMERCIAL

## 2024-01-26 VITALS
HEART RATE: 50 BPM | TEMPERATURE: 98 F | OXYGEN SATURATION: 100 % | RESPIRATION RATE: 20 BRPM | BODY MASS INDEX: 20.73 KG/M2 | HEIGHT: 69 IN | SYSTOLIC BLOOD PRESSURE: 118 MMHG | WEIGHT: 140 LBS | DIASTOLIC BLOOD PRESSURE: 72 MMHG

## 2024-01-26 DIAGNOSIS — R10.11 RUQ PAIN: ICD-10-CM

## 2024-01-26 PROCEDURE — 25000003 PHARM REV CODE 250: Performed by: INTERNAL MEDICINE

## 2024-01-26 PROCEDURE — 37000008 HC ANESTHESIA 1ST 15 MINUTES: Performed by: INTERNAL MEDICINE

## 2024-01-26 PROCEDURE — 43239 EGD BIOPSY SINGLE/MULTIPLE: CPT | Performed by: INTERNAL MEDICINE

## 2024-01-26 PROCEDURE — 88342 IMHCHEM/IMCYTCHM 1ST ANTB: CPT | Performed by: STUDENT IN AN ORGANIZED HEALTH CARE EDUCATION/TRAINING PROGRAM

## 2024-01-26 PROCEDURE — 88305 TISSUE EXAM BY PATHOLOGIST: CPT | Performed by: STUDENT IN AN ORGANIZED HEALTH CARE EDUCATION/TRAINING PROGRAM

## 2024-01-26 PROCEDURE — 43239 EGD BIOPSY SINGLE/MULTIPLE: CPT | Mod: ,,, | Performed by: INTERNAL MEDICINE

## 2024-01-26 PROCEDURE — D9220A PRA ANESTHESIA: Mod: CRNA,,, | Performed by: NURSE ANESTHETIST, CERTIFIED REGISTERED

## 2024-01-26 PROCEDURE — 25000003 PHARM REV CODE 250: Performed by: NURSE ANESTHETIST, CERTIFIED REGISTERED

## 2024-01-26 PROCEDURE — 63600175 PHARM REV CODE 636 W HCPCS: Performed by: NURSE ANESTHETIST, CERTIFIED REGISTERED

## 2024-01-26 PROCEDURE — D9220A PRA ANESTHESIA: Mod: ANES,,, | Performed by: ANESTHESIOLOGY

## 2024-01-26 PROCEDURE — 37000009 HC ANESTHESIA EA ADD 15 MINS: Performed by: INTERNAL MEDICINE

## 2024-01-26 PROCEDURE — 88305 TISSUE EXAM BY PATHOLOGIST: CPT | Mod: 26,,, | Performed by: STUDENT IN AN ORGANIZED HEALTH CARE EDUCATION/TRAINING PROGRAM

## 2024-01-26 PROCEDURE — 27201012 HC FORCEPS, HOT/COLD, DISP: Performed by: INTERNAL MEDICINE

## 2024-01-26 PROCEDURE — 88342 IMHCHEM/IMCYTCHM 1ST ANTB: CPT | Mod: 26,,, | Performed by: STUDENT IN AN ORGANIZED HEALTH CARE EDUCATION/TRAINING PROGRAM

## 2024-01-26 RX ORDER — LIDOCAINE HYDROCHLORIDE 20 MG/ML
INJECTION INTRAVENOUS
Status: DISCONTINUED | OUTPATIENT
Start: 2024-01-26 | End: 2024-01-26

## 2024-01-26 RX ORDER — SODIUM CHLORIDE 9 MG/ML
INJECTION, SOLUTION INTRAVENOUS CONTINUOUS
Status: DISCONTINUED | OUTPATIENT
Start: 2024-01-26 | End: 2024-01-26 | Stop reason: HOSPADM

## 2024-01-26 RX ORDER — PROCHLORPERAZINE EDISYLATE 5 MG/ML
5 INJECTION INTRAMUSCULAR; INTRAVENOUS EVERY 30 MIN PRN
Status: CANCELLED | OUTPATIENT
Start: 2024-01-26

## 2024-01-26 RX ORDER — PROPOFOL 10 MG/ML
VIAL (ML) INTRAVENOUS CONTINUOUS PRN
Status: DISCONTINUED | OUTPATIENT
Start: 2024-01-26 | End: 2024-01-26

## 2024-01-26 RX ORDER — PROPOFOL 10 MG/ML
VIAL (ML) INTRAVENOUS
Status: DISCONTINUED | OUTPATIENT
Start: 2024-01-26 | End: 2024-01-26

## 2024-01-26 RX ORDER — ONDANSETRON HYDROCHLORIDE 2 MG/ML
4 INJECTION, SOLUTION INTRAVENOUS DAILY PRN
Status: CANCELLED | OUTPATIENT
Start: 2024-01-26

## 2024-01-26 RX ADMIN — SODIUM CHLORIDE: 0.9 INJECTION, SOLUTION INTRAVENOUS at 07:01

## 2024-01-26 RX ADMIN — PROPOFOL 200 MCG/KG/MIN: 10 INJECTION, EMULSION INTRAVENOUS at 08:01

## 2024-01-26 RX ADMIN — LIDOCAINE HYDROCHLORIDE 100 MG: 20 INJECTION INTRAVENOUS at 08:01

## 2024-01-26 RX ADMIN — Medication 20 MG: at 08:01

## 2024-01-26 RX ADMIN — Medication 80 MG: at 08:01

## 2024-01-26 NOTE — ANESTHESIA POSTPROCEDURE EVALUATION
Anesthesia Post Evaluation    Patient: Bob Trevino JrGerald    Procedure(s) Performed: Procedure(s) (LRB):  EGD (ESOPHAGOGASTRODUODENOSCOPY) (N/A)    Final Anesthesia Type: general      Patient location during evaluation: [facility]  Patient participation: Yes- Able to Participate  Level of consciousness: awake and alert  Post-procedure vital signs: reviewed and stable  Pain management: adequate  Airway patency: patent  CHAUNCEY mitigation strategies: Multimodal analgesia  PONV status at discharge: No PONV  Anesthetic complications: no      Cardiovascular status: stable  Respiratory status: unassisted and spontaneous ventilation  Hydration status: euvolemic  Follow-up not needed.              Vitals Value Taken Time   /72 01/26/24 0917   Temp 36.8 °C (98.2 °F) 01/26/24 0915   Pulse 53 01/26/24 0921   Resp 20 01/26/24 0915   SpO2 99 % 01/26/24 0921   Vitals shown include unvalidated device data.      No case tracking events are documented in the log.      Pain/Francie Score: Francie Score: 10 (1/26/2024  8:50 AM)

## 2024-01-26 NOTE — H&P
[facility] - Endoscopy  History & Physical    Subjective:      Chief Complaint/Reason for Admission:     Direct access EGD for RUQ pain and weight loss for past one year.    Bob Trevino Jr. is a 63 y.o. male.    History reviewed. No pertinent past medical history.  Past Surgical History:   Procedure Laterality Date    COLONOSCOPY N/A 8/7/2023    Procedure: COLONOSCOPY;  Surgeon: Sly Bhatt MD;  Location: [facility];  Service: Endoscopy;  Laterality: N/A;     Social History     Tobacco Use    Smoking status: Never   Substance Use Topics    Alcohol use: Yes       PTA Medications   Medication Sig    pantoprazole (PROTONIX) 40 MG tablet Take 1 tablet (40 mg total) by mouth once daily.     Review of patient's allergies indicates:  No Known Allergies     Review of Systems   Constitutional:  Positive for weight loss. Negative for fever.   Respiratory:  Negative for shortness of breath.    Cardiovascular:  Negative for chest pain.       Objective:      Vital Signs (Most Recent)  Temp: 98.2 °F (36.8 °C) (01/26/24 0749)  Pulse: (!) 55 (01/26/24 0749)  Resp: 16 (01/26/24 0749)  BP: 105/69 (01/26/24 0749)  SpO2: 100 % (01/26/24 0749)    Vital Signs Range (Last 24H):  Temp:  [98.2 °F (36.8 °C)]   Pulse:  [55]   Resp:  [16]   BP: (105)/(69)   SpO2:  [100 %]     Physical Exam  Cardiovascular:      Rate and Rhythm: Bradycardia present.   Pulmonary:      Effort: Pulmonary effort is normal.   Neurological:      Mental Status: He is alert and oriented to person, place, and time.           Assessment:      Direct access EGD for RUQ pain and weight loss for past one year.      Plan:    Direct access EGD for RUQ pain and weight loss for past one year.

## 2024-01-26 NOTE — ANESTHESIA PREPROCEDURE EVALUATION
01/26/2024  Bob Trevino Jr. is a 63 y.o., male.      Pre-op Assessment    I have reviewed the Patient Summary Reports.     I have reviewed the Nursing Notes. I have reviewed the NPO Status.   I have reviewed the Medications.     Review of Systems  Anesthesia Hx:  No problems with previous Anesthesia                Social:  Alcohol Use, Smoker, Recreational Drugs       Hematology/Oncology:    Oncology Normal                                   EENT/Dental:  EENT/Dental Normal           Cardiovascular:  Exercise tolerance: good                                           Pulmonary:  Pulmonary Normal                       Hepatic/GI:     GERD, well controlled             Psych:  Psychiatric Normal                    Physical Exam  General: Well nourished, Cooperative and Alert    Airway:  Mallampati: III   Mouth Opening: Normal  TM Distance: Normal  Tongue: Normal  Neck ROM: Normal ROM    Chest/Lungs:  Normal Respiratory Rate    Heart:  Rate: Normal        Anesthesia Plan  Type of Anesthesia, risks & benefits discussed:    Anesthesia Type: Gen ETT  Intra-op Monitoring Plan: Standard ASA Monitors  Post Op Pain Control Plan: multimodal analgesia and IV/PO Opioids PRN  Induction:  IV  Airway Plan: Direct, Post-Induction  Informed Consent: Informed consent signed with the Patient and all parties understand the risks and agree with anesthesia plan.  All questions answered.   ASA Score: 2  Day of Surgery Review of History & Physical: H&P Update referred to the surgeon/provider.    Ready For Surgery From Anesthesia Perspective.     .

## 2024-01-26 NOTE — TRANSFER OF CARE
"Anesthesia Transfer of Care Note    Patient: Bob Trevino Jr.    Procedure(s) Performed: Procedure(s) (LRB):  EGD (ESOPHAGOGASTRODUODENOSCOPY) (N/A)    Patient location: [facility]    Anesthesia Type: general    Transport from OR: Transported from OR on 2-3 L/min O2 by NC with adequate spontaneous ventilation    Post pain: adequate analgesia    Post assessment: no apparent anesthetic complications    Post vital signs: stable    Level of consciousness: responds to stimulation    Nausea/Vomiting: no nausea/vomiting    Complications: none    Transfer of care protocol was followed    Last vitals: Visit Vitals  /69   Pulse (!) 55   Temp 36.8 °C (98.2 °F)   Resp 16   Ht 5' 9" (1.753 m)   Wt 63.5 kg (140 lb)   SpO2 100%   BMI 20.67 kg/m²     "

## 2024-01-26 NOTE — PROVATION PATIENT INSTRUCTIONS
Discharge Summary/Instructions after an Endoscopic Procedure  Patient Name: Bob Trevino  Patient MRN: [id number]  Patient YOB: 1960 Friday, January 26, 2024  Layton Kahn MD  Dear patient,  As a result of recent federal legislation (The Federal Cures Act), you may   receive lab or pathology results from your procedure in your [facility]   account before your physician is able to contact you. Your physician or   their representative will relay the results to you with their   recommendations at their soonest availability.  Thank you,  RESTRICTIONS:  During your procedure today, you received medications for sedation.  These   medications may affect your judgment, balance and coordination.  Therefore,   for 24 hours, you have the following restrictions:   - DO NOT drive a car, operate machinery, make legal/financial decisions,   sign important papers or drink alcohol.    ACTIVITY:  Today: no heavy lifting, straining or running due to procedural   sedation/anesthesia.  The following day: return to full activity including work.  DIET:  Eat and drink normally unless instructed otherwise.     TREATMENT FOR COMMON SIDE EFFECTS:  - Mild abdominal pain, nausea, belching, bloating or excessive gas:  rest,   eat lightly and use a heating pad.  - Sore Throat: treat with throat lozenges and/or gargle with warm salt   water.  - Because air was used during the procedure, expelling large amounts of air   from your rectum or belching is normal.  - If a bowel prep was taken, you may not have a bowel movement for 1-3 days.    This is normal.  SYMPTOMS TO WATCH FOR AND REPORT TO YOUR PHYSICIAN:  1. Abdominal pain or bloating, other than gas cramps.  2. Chest pain.  3. Back pain.  4. Signs of infection such as: chills or fever occurring within 24 hours   after the procedure.  5. Rectal bleeding, which would show as bright red, maroon, or black stools.   (A tablespoon of blood from the rectum is not serious, especially if    hemorrhoids are present.)  6. Vomiting.  7. Weakness or dizziness.  GO DIRECTLY TO THE NEAREST EMERGENCY ROOM IF YOU HAVE ANY OF THE FOLLOWING:      Difficulty breathing              Chills and/or fever over 101 F   Persistent vomiting and/or vomiting blood   Severe abdominal pain   Severe chest pain   Black, tarry stools   Bleeding- more than one tablespoon   Any other symptom or condition that you feel may need urgent attention  Your doctor recommends these additional instructions:  If any biopsies were taken, your doctors clinic will contact you in 1 to 2   weeks with any results.  - Discharge patient to home.   - Await pathology results.   - Telephone endoscopist for pathology results in 3 weeks.   - Return to nurse practitioner.   - Telephone nurse practitioner for study results in 2 weeks.   - Return to primary care physician in 1 week.   - The findings and recommendations were discussed with the patient.   - The findings and recommendations were discussed with the patient's   family.  For questions, problems or results please call your physician - Layton Kahn MD at Work:  [phone].  [facility], EMERGENCY ROOM PHONE NUMBER: [phone]  IF A COMPLICATION OR EMERGENCY SITUATION ARISES AND YOU ARE UNABLE TO REACH   YOUR PHYSICIAN - GO DIRECTLY TO THE EMERGENCY ROOM.  Layton Kahn MD  1/26/2024 8:43:32 AM  This report has been verified and signed electronically.  Dear patient,  As a result of recent federal legislation (The Federal Cures Act), you may   receive lab or pathology results from your procedure in your [facility]   account before your physician is able to contact you. Your physician or   their representative will relay the results to you with their   recommendations at their soonest availability.  Thank you,  PR[employer]ATION

## 2024-01-26 NOTE — PLAN OF CARE
Patient discharged to home via wheelchair, escorted by [facility] transport. Pt alert and talkative, vitals stable on room air, tolerating PO intake. Discharge instructions (written and verbal) and follow-up information given to patient who verbalized understanding, as well as a readiness for discharge. C contact info provided for additional questions following discharge. KALEY

## 2024-02-04 NOTE — PROGRESS NOTES
Dr. Costello can you let me know if patient is H pylori immunostains have posted yet thank you.    Bob waiting on pathologies posting H pylori results but as of yet no evidence of celiac sprue no dysplasia.

## 2024-02-05 LAB
FINAL PATHOLOGIC DIAGNOSIS: NORMAL
GROSS: NORMAL
Lab: NORMAL
SUPPLEMENTAL DIAGNOSIS: NORMAL

## 2024-02-07 NOTE — PROGRESS NOTES
Bob your EGD pathology was benign no dysplasia no H pylori no celiac sprue     1. Duodenum, biopsy:  - Benign duodenal mucosa without significant histopathologic alteration  - No evidence of celiac disease  - Negative for dysplasia or carcinoma    2. Stomach, biopsy:  - Gastric antral and oxyntic mucosa with mild features of reactive gastropathy  - Immunohistochemistry for Helicobacter pylori pending; results to follow in a supplemental report  - Negative for intestinal metaplasia  - Negative for dysplasia or carcinoma VC     Comment: Interp By Ramakrishna Costello M.D., Signed on 02/05/2024 at 11:44  Supplemental Diagnosis      This addendum is being issued to report results of Helicobacter pylori immunohistochemistry, performed on block 2A (stomach biopsy) with appropriate and reactive controls:    Helicobacter pylori (IHC): Negative    The prior diagnosis is unchanged.

## 2024-12-21 DIAGNOSIS — K21.9 GASTROESOPHAGEAL REFLUX DISEASE, UNSPECIFIED WHETHER ESOPHAGITIS PRESENT: ICD-10-CM

## 2024-12-23 RX ORDER — PANTOPRAZOLE SODIUM 40 MG/1
40 TABLET, DELAYED RELEASE ORAL
Qty: 90 TABLET | Refills: 0 | Status: SHIPPED | OUTPATIENT
Start: 2024-12-23

## 2025-01-03 ENCOUNTER — OFFICE VISIT (OUTPATIENT)
Dept: URGENT CARE | Facility: CLINIC | Age: 65
End: 2025-01-03
Payer: COMMERCIAL

## 2025-01-03 VITALS
HEART RATE: 85 BPM | WEIGHT: 150 LBS | OXYGEN SATURATION: 97 % | RESPIRATION RATE: 20 BRPM | BODY MASS INDEX: 22.22 KG/M2 | HEIGHT: 69 IN | TEMPERATURE: 99 F | SYSTOLIC BLOOD PRESSURE: 121 MMHG | DIASTOLIC BLOOD PRESSURE: 79 MMHG

## 2025-01-03 DIAGNOSIS — R10.31 GROIN PAIN, RIGHT: ICD-10-CM

## 2025-01-03 DIAGNOSIS — K40.90 RIGHT INGUINAL HERNIA: Primary | ICD-10-CM

## 2025-01-03 LAB
BILIRUBIN, UA POC OHS: ABNORMAL
BLOOD, UA POC OHS: NEGATIVE
CLARITY, UA POC OHS: CLEAR
COLOR, UA POC OHS: ABNORMAL
GLUCOSE, UA POC OHS: NEGATIVE
KETONES, UA POC OHS: NEGATIVE
LEUKOCYTES, UA POC OHS: NEGATIVE
NITRITE, UA POC OHS: NEGATIVE
PH, UA POC OHS: 5.5
PROTEIN, UA POC OHS: ABNORMAL
SPECIFIC GRAVITY, UA POC OHS: >=1.03
UROBILINOGEN, UA POC OHS: 0.2

## 2025-01-03 NOTE — PROGRESS NOTES
"Subjective:      Patient ID: Bob Trevino Jr. is a 64 y.o. male.    Vitals:  height is 5' 9" (1.753 m) and weight is 68 kg (150 lb). His oral temperature is 99.2 °F (37.3 °C). His blood pressure is 121/79 and his pulse is 85. His respiration is 20 and oxygen saturation is 97%.     Chief Complaint: Groin Pain    63 yo Male c/o L groin pain for mth pt did not take any pain meds for the pain. He reports only feeling the pain and bulging to right groin when coughing, lifting or straining. Pain improves after activity.     Groin Pain  The patient's primary symptoms include pelvic pain. The patient's pertinent negatives include no penile pain or testicular pain. This is a new problem. The current episode started 1 to 4 weeks ago. The problem occurs constantly. The problem has been gradually worsening. The pain is moderate. Pertinent negatives include no dysuria, fever, frequency, nausea, urgency or vomiting. The symptoms are aggravated by heavy lifting. He has tried nothing for the symptoms. The treatment provided no relief. He is sexually active. He never uses condoms. No, his partner does not have an STD.       Constitution: Negative for fever.   Gastrointestinal:  Negative for nausea and vomiting.   Genitourinary:  Positive for pelvic pain. Negative for dysuria, frequency, urgency, penile pain and testicular pain.      Objective:     Physical Exam   Constitutional: He is oriented to person, place, and time. He appears well-developed.   HENT:   Head: Normocephalic and atraumatic.   Ears:   Right Ear: External ear normal.   Left Ear: External ear normal.   Nose: Nose normal.   Mouth/Throat: Mucous membranes are normal.   Eyes: Conjunctivae and lids are normal.   Neck: Trachea normal. Neck supple.   Cardiovascular: Normal rate, regular rhythm and normal heart sounds.   Pulmonary/Chest: Effort normal and breath sounds normal. No respiratory distress.   Abdominal: Normal appearance and bowel sounds are normal. He exhibits no " distension and no mass. Soft. There is no abdominal tenderness. A hernia is present. Hernia confirmed positive in the right inguinal area.   Musculoskeletal: Normal range of motion.         General: Normal range of motion.   Neurological: He is alert and oriented to person, place, and time. He has normal strength.   Skin: Skin is warm, dry, intact, not diaphoretic and not pale.   Psychiatric: His speech is normal and behavior is normal. Judgment and thought content normal.   Nursing note and vitals reviewed.chaperone present         Assessment:     1. Right inguinal hernia    2. Groin pain, right        Plan:     Results for orders placed or performed in visit on 01/03/25   POCT Urinalysis(Instrument)    Collection Time: 01/03/25 12:46 PM   Result Value Ref Range    Color, POC UA Magui (A) Yellow, Straw, Colorless    Clarity, POC UA Clear Clear    Glucose, POC UA Negative Negative    Bilirubin, POC UA Small (A) Negative    Ketones, POC UA Negative Negative    Spec Grav POC UA >=1.030 1.005 - 1.030    Blood, POC UA Negative Negative    pH, POC UA 5.5 5.0 - 8.0    Protein, POC UA Trace (A) Negative    Urobilinogen, POC UA 0.2 <=1.0    Nitrite, POC UA Negative Negative    WBC, POC UA Negative Negative         Right inguinal hernia  -     Ambulatory referral/consult to General Surgery    Groin pain, right  -     POCT Urinalysis(Instrument)            Discussed results/diagnosis/plan with patient in clinic. Strict precautions given to patient to monitor for worsening signs and symptoms. Advised to follow up with PCP or specialist.  Explained side effects of medications prescribed with patient and informed him/her to discontinue use if he/she has any side effects and to inform UC or PCP if this occurs. All questions answered. Strict ED verses clinic return precautions stressed and given in depth. Advised if symptoms worsens of fail to improve he/she should go to the Emergency Room. Discharge and follow-up instructions  given verbally/printed with the patient who expressed understanding and willingness to comply with my recommendations. Patient voiced understanding and in agreement with current treatment plan. Patient exits the exam room in no acute distress. Conversant and engaged during discharge discussion, verbalized understanding.

## 2025-01-03 NOTE — PATIENT INSTRUCTIONS
Alternate tylenol and ibuprofen every 4 hours as needed for pain. Always take ibuprofen with food and water to avoid GI upset. Stop taking if you develop abdominal pain or blood in stool.     Avoid straining and heavy lifting.   Apply pressure to the area while coughing.   Apply warm compresses.   High fiber diet and increase water intake to avoid constipation.     If painful and protruding, lie flat on your back and try to push hernia back in.     Follow up with general surgery. A referral was placed for you, call [phone] to schedule appointment.     Go to the ER if you have severe pain that is not made better by the above recommendations.

## 2025-01-14 ENCOUNTER — OFFICE VISIT (OUTPATIENT)
Dept: SURGERY | Facility: CLINIC | Age: 65
End: 2025-01-14
Payer: COMMERCIAL

## 2025-01-14 VITALS
HEART RATE: 89 BPM | SYSTOLIC BLOOD PRESSURE: 110 MMHG | OXYGEN SATURATION: 99 % | DIASTOLIC BLOOD PRESSURE: 68 MMHG | HEIGHT: 69 IN | BODY MASS INDEX: 20.73 KG/M2 | WEIGHT: 140 LBS

## 2025-01-14 DIAGNOSIS — K40.90 NON-RECURRENT UNILATERAL INGUINAL HERNIA WITHOUT OBSTRUCTION OR GANGRENE: Primary | ICD-10-CM

## 2025-01-14 PROCEDURE — 3288F FALL RISK ASSESSMENT DOCD: CPT | Mod: CPTII,S$GLB,, | Performed by: SURGERY

## 2025-01-14 PROCEDURE — 3008F BODY MASS INDEX DOCD: CPT | Mod: CPTII,S$GLB,, | Performed by: SURGERY

## 2025-01-14 PROCEDURE — 3078F DIAST BP <80 MM HG: CPT | Mod: CPTII,S$GLB,, | Performed by: SURGERY

## 2025-01-14 PROCEDURE — 1101F PT FALLS ASSESS-DOCD LE1/YR: CPT | Mod: CPTII,S$GLB,, | Performed by: SURGERY

## 2025-01-14 PROCEDURE — 1160F RVW MEDS BY RX/DR IN RCRD: CPT | Mod: CPTII,S$GLB,, | Performed by: SURGERY

## 2025-01-14 PROCEDURE — 99999 PR PBB SHADOW E&M-EST. PATIENT-LVL III: CPT | Mod: PBBFAC,,, | Performed by: SURGERY

## 2025-01-14 PROCEDURE — 1126F AMNT PAIN NOTED NONE PRSNT: CPT | Mod: CPTII,S$GLB,, | Performed by: SURGERY

## 2025-01-14 PROCEDURE — 3074F SYST BP LT 130 MM HG: CPT | Mod: CPTII,S$GLB,, | Performed by: SURGERY

## 2025-01-14 PROCEDURE — 1159F MED LIST DOCD IN RCRD: CPT | Mod: CPTII,S$GLB,, | Performed by: SURGERY

## 2025-01-14 PROCEDURE — 99203 OFFICE O/P NEW LOW 30 MIN: CPT | Mod: S$GLB,,, | Performed by: SURGERY

## 2025-01-14 RX ORDER — AMOXICILLIN 500 MG/1
500 CAPSULE ORAL 4 TIMES DAILY
COMMUNITY
Start: 2024-12-31

## 2025-01-14 NOTE — PROGRESS NOTES
History & Physical    SUBJECTIVE:     History of Present Illness:  Patient is a 64 y.o. male referred for evaluation of right inguinal hernia.  Patient noticed a bulge in the right groin some time after lifting several heavy objects.  Occasional pain when doing these activities.  He also has some bulging and pain with coughing.  No pain or bulge on the left    Review of patient's allergies indicates:  No Known Allergies    Current Outpatient Medications   Medication Sig Dispense Refill    amoxicillin (AMOXIL) 500 MG capsule Take 500 mg by mouth 4 (four) times daily.      pantoprazole (PROTONIX) 40 MG tablet TAKE 1 TABLET(40 MG) BY MOUTH EVERY DAY 90 tablet 0     No current facility-administered medications for this visit.     Facility-Administered Medications Ordered in Other Visits   Medication Dose Route Frequency Provider Last Rate Last Admin    0.9%  NaCl infusion   Intravenous Continuous Sly Bhatt MD        LIDOcaine (PF) 10 mg/ml (1%) injection 10 mg  1 mL Intradermal Once PRN Sly Bhatt MD           History reviewed. No pertinent past medical history.  Past Surgical History:   Procedure Laterality Date    COLONOSCOPY N/A 2023    Procedure: COLONOSCOPY;  Surgeon: Sly Bhatt MD;  Location: [facility];  Service: Endoscopy;  Laterality: N/A;    ESOPHAGOGASTRODUODENOSCOPY N/A 2024    Procedure: EGD (ESOPHAGOGASTRODUODENOSCOPY);  Surgeon: Layton Kahn MD;  Location: [address]);  Service: Endoscopy;  Laterality: N/A;  Ref By: Ivanna López  Procedure: EGD  Diagnosis: Abdominal pain, GERD, Weight loss  Procedure Timin-12  Provider: Dr Kahn  Location: [facility] [place] 2-Endo  24- LVM for precall - ERW  -precall complete-KPvt  -pt notified of earlier arrival t     No family history on file.  Social History     Tobacco Use    Smoking status: Never   Substance Use Topics    Alcohol use: Yes        Review of Systems:  Review of Systems  "  Constitutional:  Negative for appetite change, fatigue, fever and unexpected weight change.   HENT:  Negative for sore throat and trouble swallowing.    Eyes: Negative.    Respiratory:  Negative for cough, shortness of breath and wheezing.    Cardiovascular:  Negative for chest pain and leg swelling.   Gastrointestinal:  Negative for abdominal distention, abdominal pain, blood in stool, constipation, diarrhea, nausea and vomiting.   Endocrine: Negative.    Genitourinary: Negative.    Musculoskeletal:  Negative for back pain.   Skin: Negative.  Negative for rash.   Allergic/Immunologic: Negative.    Neurological: Negative.    Hematological: Negative.    Psychiatric/Behavioral:  Negative for confusion.        OBJECTIVE:     Vital Signs (Most Recent)  Pulse: 89 (01/14/25 0919)  BP: 110/68 (01/14/25 0919)  SpO2: 99 % (01/14/25 0919)  5' 9" (1.753 m)  63.5 kg (140 lb)     Physical Exam:  Physical Exam  Constitutional:       General: He is not in acute distress.     Appearance: Normal appearance.   Eyes:      General: No scleral icterus.  Cardiovascular:      Rate and Rhythm: Normal rate and regular rhythm.   Pulmonary:      Breath sounds: Normal breath sounds.   Abdominal:      General: There is no distension.      Palpations: Abdomen is soft.      Tenderness: There is no abdominal tenderness.      Comments: Small reducible bulge exiting the right external ring.  No bulge at the left external ring or bilateral femoral spaces even with Valsalva standing   Musculoskeletal:         General: Normal range of motion.      Cervical back: Neck supple.   Lymphadenopathy:      Cervical: No cervical adenopathy.   Skin:     General: Skin is warm and dry.      Findings: No rash.   Neurological:      General: No focal deficit present.      Mental Status: He is alert.   Psychiatric:         Mood and Affect: Mood normal.         Laboratory      Diagnostic Results:       ASSESSMENT/PLAN:     Reducible right inguinal hernia, " initial    PLAN:Plan     Open inguinal hernia repair with mesh    We also discussed robotic repair with mesh.  After discussion, patient thinks that that procedure sounds to involved in prefers an open repair with mesh.    The general risks of the operation were described to the patient in detail and included on an informed consent sheet, which I reviewed with the patientand which the patient has signed.  Illustrations were provided for clarity.  The patient was offered the opportunity to ask questions, on multiple occasions, and after continued discussion had none additional.  The patient is ready to proceed with the operation.  He wishes to proceed February 27th. Call the office or report to the emergency department if symptoms worsen prior to the operation

## 2025-02-17 ENCOUNTER — HOSPITAL ENCOUNTER (OUTPATIENT)
Dept: PREADMISSION TESTING | Facility: OTHER | Age: 65
Discharge: HOME | End: 2025-02-17
Attending: SURGERY
Payer: COMMERCIAL

## 2025-02-17 ENCOUNTER — ANESTHESIA EVENT (OUTPATIENT)
Dept: SURGERY | Facility: OTHER | Age: 65
End: 2025-02-17
Payer: COMMERCIAL

## 2025-02-17 VITALS
BODY MASS INDEX: 21.48 KG/M2 | RESPIRATION RATE: 16 BRPM | OXYGEN SATURATION: 98 % | HEART RATE: 69 BPM | TEMPERATURE: 98 F | WEIGHT: 145 LBS | SYSTOLIC BLOOD PRESSURE: 125 MMHG | HEIGHT: 69 IN | DIASTOLIC BLOOD PRESSURE: 73 MMHG

## 2025-02-17 RX ORDER — SODIUM CHLORIDE, SODIUM LACTATE, POTASSIUM CHLORIDE, CALCIUM CHLORIDE 600; 310; 30; 20 MG/100ML; MG/100ML; MG/100ML; MG/100ML
INJECTION, SOLUTION INTRAVENOUS CONTINUOUS
OUTPATIENT
Start: 2025-02-17

## 2025-02-17 RX ORDER — ACETAMINOPHEN 500 MG
1000 TABLET ORAL
OUTPATIENT
Start: 2025-02-17 | End: 2025-02-17

## 2025-02-17 NOTE — ANESTHESIA PREPROCEDURE EVALUATION
02/17/2025  Bob Trevino Jr. is a 64 y.o., male.      Pre-op Assessment    I have reviewed the Patient Summary Reports.     I have reviewed the Nursing Notes.    I have reviewed the Medications.     Review of Systems  Anesthesia Hx:  No problems with previous Anesthesia             Denies Family Hx of Anesthesia complications.    Denies Personal Hx of Anesthesia complications.                    Social:  Non-Smoker       Hematology/Oncology:  Hematology Normal   Oncology Normal                                   EENT/Dental:  EENT/Dental Normal           Cardiovascular:  Cardiovascular Normal Exercise tolerance: good                                             Pulmonary:  Pulmonary Normal                       Renal/:  Renal/ Normal                 Musculoskeletal:  Musculoskeletal Normal                Neurological:  Neurology Normal                                      Endocrine:  Endocrine Normal            Dermatological:  Skin Normal    Psych:  Psychiatric Normal                    Physical Exam  General: Well nourished, Cooperative, Oriented and Alert    Airway:  Mallampati: II / II  Mouth Opening: Normal  TM Distance: Normal  Neck ROM: Normal ROM    Dental:  Intact        Anesthesia Plan  Type of Anesthesia, risks & benefits discussed:    Anesthesia Type: Gen ETT, Gen Supraglottic Airway, MAC, Regional  Intra-op Monitoring Plan: Standard ASA Monitors  Post Op Pain Control Plan: multimodal analgesia  Induction:  IV  Airway Plan: Video and Direct  Informed Consent: Informed consent signed with the Patient and all parties understand the risks and agree with anesthesia plan.  All questions answered.   ASA Score: 1  Day of Surgery Review of History & Physical: H&P Update referred to the surgeon/provider.  Anesthesia Plan Notes: No labs    Ready For Surgery From Anesthesia Perspective.     .

## 2025-02-17 NOTE — DISCHARGE INSTRUCTIONS
Information to Prepare you for your Surgery    PRE-ADMIT TESTING   [address]  [place] [address]  [address]   [phone]  - CARL Mae    Your surgery has been scheduled at [facility]. We are pleased to have the opportunity to serve you. For Further Information please call [phone].    On the day of surgery please report to Registration on the 1st floor of the [facility].    CONTACT YOUR PHYSICIAN'S OFFICE THE DAY PRIOR TO YOUR SURGERY TO OBTAIN YOUR ARRIVAL TIME.     The evening before surgery do not eat anything after 9 p.m. ( this includes hard candy, chewing gum and mints).  You may only have GATORADE, POWERADE AND WATER  from 9 p.m. until you leave your home.     DRINK AT LEAST 12 OUNCES THE MORNING OF SURGERY    DO NOT DRINK ANY LIQUIDS ON THE WAY TO THE HOSPITAL.      Why does your anesthesiologist allow you to drink Gatorade/Powerade before surgery?    Gatorade/Powerade helps to increase your comfort before surgery and to decrease your nausea after surgery.  The carbohydrates in the Gatorade/Powerade help reduce your body's stress response to surgery.  If you are diabetic, drink only water prior to surgery.       Outpatient Surgery- May allow 2 adults (18 and older)/ Support Persons (1 being the designated ) for all surgical/procedural patients. A breastfeeding mother will be allowed her infant and 2 adult Support Persons. No one under the age of 18 will be allowed in the building.    MEDICATION INSTRUCTIONS: TAKE medications checked off by the Anesthesiologist on your Medication List.      Angiogram Patients: Take medications as instructed by your physician, including aspirin.     Surgery Patients:  If you take ASPIRIN - Your PHYSICIAN/SURGEON will need to inform you IF/OR when you need to stop taking aspirin prior to your surgery.     Starting the week prior to surgery, do not take any medications containing IBUPROFEN or NSAIDS (Advil,  Aleve, BC, Celebrex, Goody's, Ketorolac, Meloxicam, Mobic, Motrin, Naproxen, Toradol, etc).  If you are not sure if you should take a medicine please call your surgeon's office.  You may take Tylenol.    Do Not Wear any make-up (especially eye make-up) to surgery. Please remove any false eyelashes or eyelash extensions. If you arrive the day of surgery with makeup/eyelashes on you will be required to remove prior to surgery. (There is a risk of corneal abrasions if eye makeup/eyelash extensions are not removed)    Leave all valuables at home.   Do Not wear any jewelry or watches, including any metal in body piercings. Jewelry must be removed prior to coming to the hospital.  There is a possibility that rings that are unable to be removed may be cut off if they are on the surgical extremity.    Please remove all hair extensions, wigs, clips and any other metal accessories/ ornaments from your hair.  These items may pose a flammable/fire risk in Surgery and must be removed.    Do not shave your surgical area at least 5 days prior to your surgery. The surgical prep will be performed at the hospital according to Infection Control regulations.    Contact Lens must be removed before surgery. Either do not wear the contact lens or bring a case and solution for storage.  Please bring a container for eyeglasses or dentures as required.  Bring any paperwork your physician has provided, such as consent forms,  history and physicals, doctor's orders, etc.   Bring comfortable clothes that are loose fitting to wear upon discharge. Take into consideration the type of surgery being performed.  Maintain your diet as advised per your physician the day prior to surgery.    Adequate rest the night before surgery is advised.   Park in the Parking lot behind the hospital or in the [facility] across the street from the parking lot. Parking is complimentary.  If you will be discharged the same day as your procedure, please  arrange for a responsible adult to drive you home or to accompany you if traveling by taxi.   YOU WILL NOT BE PERMITTED TO DRIVE OR TO LEAVE THE HOSPITAL ALONE AFTER SURGERY.   If you are being discharged the same day, it is strongly recommended that you arrange for someone to remain with you for the first 24 hrs following your surgery.    The Surgeon will speak to your family/visitor after your surgery regarding the outcome of your surgery and post op care.  The Surgeon may speak to you after your surgery, but there is a possibility you may not remember the details.  Please check with your family members regarding the conversation with the Surgeon.         Bathing Instructions with Hibiclens:    Shower the evening before and morning of your procedure with Chlorhexidine (Hibiclens)  do not use Chlorhexidine on your face or genitals. Do not get in your eyes.  Wash your face with water and your regular face wash/soap  Use your regular shampoo  Apply Chlorhexidine (Hibiclens) directly on your skin or on a wet washcloth and wash gently. When showering: Move away from the shower stream when applying Chlorhexidine (Hibiclens) to avoid rinsing off too soon.  Rinse thoroughly with warm water  Do not dilute Chlorhexidine (Hibiclens)   Dry off as usual, do not use any deodorant, powder, body lotions, perfume, after shave or cologne.     We strongly recommend whoever is bringing you home be present for discharge instructions.  This will ensure a thorough understanding for your post op home care.    If the patient has fever, cough, or signs/symptoms of Flu or Covid please do not come in for your surgery.  First, contact the pre op department at [phone] (unit opens at 5AM) and then contact your surgeon and your primary care physician for further instructions.      If applicable, please bring any blood pressure and/or diabetes medications with you the day of surgery.  If on home oxygen, even at night, please bring your  portable oxygen tank with you the day of surgery in case it is needed for your discharge.      Thanks,  CARL Mae

## 2025-02-24 ENCOUNTER — HOSPITAL ENCOUNTER (OUTPATIENT)
Facility: OTHER | Age: 65
Discharge: HOME | End: 2025-02-24
Attending: SURGERY | Admitting: SURGERY
Payer: COMMERCIAL

## 2025-02-24 ENCOUNTER — ANESTHESIA (OUTPATIENT)
Dept: SURGERY | Facility: OTHER | Age: 65
End: 2025-02-24
Payer: COMMERCIAL

## 2025-02-24 VITALS
TEMPERATURE: 98 F | WEIGHT: 145 LBS | DIASTOLIC BLOOD PRESSURE: 76 MMHG | OXYGEN SATURATION: 98 % | RESPIRATION RATE: 16 BRPM | BODY MASS INDEX: 21.48 KG/M2 | SYSTOLIC BLOOD PRESSURE: 125 MMHG | HEIGHT: 69 IN | HEART RATE: 61 BPM

## 2025-02-24 DIAGNOSIS — K40.90 NON-RECURRENT INGUINAL HERNIA OF RIGHT SIDE WITHOUT OBSTRUCTION OR GANGRENE: ICD-10-CM

## 2025-02-24 PROCEDURE — 25000003 PHARM REV CODE 250: Performed by: ANESTHESIOLOGY

## 2025-02-24 PROCEDURE — 71000015 HC POSTOP RECOV 1ST HR: Performed by: SURGERY

## 2025-02-24 PROCEDURE — 37000009 HC ANESTHESIA EA ADD 15 MINS: Performed by: SURGERY

## 2025-02-24 PROCEDURE — 36000707: Performed by: SURGERY

## 2025-02-24 PROCEDURE — 63600175 PHARM REV CODE 636 W HCPCS: Performed by: NURSE ANESTHETIST, CERTIFIED REGISTERED

## 2025-02-24 PROCEDURE — 63600175 PHARM REV CODE 636 W HCPCS: Performed by: SURGERY

## 2025-02-24 PROCEDURE — C1781 MESH (IMPLANTABLE): HCPCS | Performed by: SURGERY

## 2025-02-24 PROCEDURE — 37000008 HC ANESTHESIA 1ST 15 MINUTES: Performed by: SURGERY

## 2025-02-24 PROCEDURE — 25000003 PHARM REV CODE 250: Performed by: SURGERY

## 2025-02-24 PROCEDURE — 36000706: Performed by: SURGERY

## 2025-02-24 PROCEDURE — 25000003 PHARM REV CODE 250: Performed by: INTERNAL MEDICINE

## 2025-02-24 PROCEDURE — 71000033 HC RECOVERY, INTIAL HOUR: Performed by: SURGERY

## 2025-02-24 PROCEDURE — 49505 PRP I/HERN INIT REDUC >5 YR: CPT | Mod: RT,,, | Performed by: SURGERY

## 2025-02-24 PROCEDURE — D9220A PRA ANESTHESIA: Mod: CRNA,,, | Performed by: NURSE ANESTHETIST, CERTIFIED REGISTERED

## 2025-02-24 PROCEDURE — C1729 CATH, DRAINAGE: HCPCS | Performed by: SURGERY

## 2025-02-24 PROCEDURE — D9220A PRA ANESTHESIA: Mod: ANES,,, | Performed by: ANESTHESIOLOGY

## 2025-02-24 PROCEDURE — 71000039 HC RECOVERY, EACH ADD'L HOUR: Performed by: SURGERY

## 2025-02-24 DEVICE — SELF-GRIPPING POLYESTER MESH,RIGHT ANATOMICAL, POLYESTER WITH POLYLACTIC ACID GRIPS
Type: IMPLANTABLE DEVICE | Site: GROIN | Status: FUNCTIONAL
Brand: PROGRIP

## 2025-02-24 RX ORDER — SODIUM CHLORIDE 9 MG/ML
INJECTION, SOLUTION INTRAVENOUS CONTINUOUS
Status: DISCONTINUED | OUTPATIENT
Start: 2025-02-24 | End: 2025-02-24 | Stop reason: HOSPADM

## 2025-02-24 RX ORDER — MIDAZOLAM HYDROCHLORIDE 1 MG/ML
INJECTION INTRAMUSCULAR; INTRAVENOUS
Status: DISCONTINUED | OUTPATIENT
Start: 2025-02-24 | End: 2025-02-24

## 2025-02-24 RX ORDER — GLUCAGON 1 MG
1 KIT INJECTION
Status: DISCONTINUED | OUTPATIENT
Start: 2025-02-24 | End: 2025-02-24 | Stop reason: HOSPADM

## 2025-02-24 RX ORDER — DEXAMETHASONE SODIUM PHOSPHATE 4 MG/ML
INJECTION, SOLUTION INTRA-ARTICULAR; INTRALESIONAL; INTRAMUSCULAR; INTRAVENOUS; SOFT TISSUE
Status: DISCONTINUED | OUTPATIENT
Start: 2025-02-24 | End: 2025-02-24

## 2025-02-24 RX ORDER — PROPOFOL 10 MG/ML
VIAL (ML) INTRAVENOUS
Status: DISCONTINUED | OUTPATIENT
Start: 2025-02-24 | End: 2025-02-24

## 2025-02-24 RX ORDER — BUPIVACAINE HCL/EPINEPHRINE 0.25-.0005
VIAL (ML) INJECTION
Status: DISCONTINUED | OUTPATIENT
Start: 2025-02-24 | End: 2025-02-24 | Stop reason: HOSPADM

## 2025-02-24 RX ORDER — CEFAZOLIN 2 G/1
2 INJECTION, POWDER, FOR SOLUTION INTRAMUSCULAR; INTRAVENOUS
Status: COMPLETED | OUTPATIENT
Start: 2025-02-24 | End: 2025-02-24

## 2025-02-24 RX ORDER — ONDANSETRON HYDROCHLORIDE 2 MG/ML
INJECTION, SOLUTION INTRAMUSCULAR; INTRAVENOUS
Status: DISCONTINUED | OUTPATIENT
Start: 2025-02-24 | End: 2025-02-24

## 2025-02-24 RX ORDER — OXYCODONE HYDROCHLORIDE 5 MG/1
5 TABLET ORAL
Status: DISCONTINUED | OUTPATIENT
Start: 2025-02-24 | End: 2025-02-24 | Stop reason: HOSPADM

## 2025-02-24 RX ORDER — LIDOCAINE HYDROCHLORIDE 20 MG/ML
INJECTION INTRAVENOUS
Status: DISCONTINUED | OUTPATIENT
Start: 2025-02-24 | End: 2025-02-24

## 2025-02-24 RX ORDER — SODIUM CHLORIDE 0.9 % (FLUSH) 0.9 %
3 SYRINGE (ML) INJECTION
Status: DISCONTINUED | OUTPATIENT
Start: 2025-02-24 | End: 2025-02-24 | Stop reason: HOSPADM

## 2025-02-24 RX ORDER — SODIUM CHLORIDE, SODIUM LACTATE, POTASSIUM CHLORIDE, CALCIUM CHLORIDE 600; 310; 30; 20 MG/100ML; MG/100ML; MG/100ML; MG/100ML
INJECTION, SOLUTION INTRAVENOUS CONTINUOUS
Status: DISCONTINUED | OUTPATIENT
Start: 2025-02-24 | End: 2025-02-24 | Stop reason: HOSPADM

## 2025-02-24 RX ORDER — PROCHLORPERAZINE EDISYLATE 5 MG/ML
5 INJECTION INTRAMUSCULAR; INTRAVENOUS EVERY 30 MIN PRN
Status: DISCONTINUED | OUTPATIENT
Start: 2025-02-24 | End: 2025-02-24 | Stop reason: HOSPADM

## 2025-02-24 RX ORDER — LIDOCAINE HYDROCHLORIDE AND EPINEPHRINE 10; 10 UG/ML; MG/ML
INJECTION, SOLUTION INFILTRATION; PERINEURAL
Status: DISCONTINUED | OUTPATIENT
Start: 2025-02-24 | End: 2025-02-24 | Stop reason: HOSPADM

## 2025-02-24 RX ORDER — FENTANYL CITRATE 50 UG/ML
INJECTION, SOLUTION INTRAMUSCULAR; INTRAVENOUS
Status: DISCONTINUED | OUTPATIENT
Start: 2025-02-24 | End: 2025-02-24

## 2025-02-24 RX ORDER — OXYCODONE AND ACETAMINOPHEN 5; 325 MG/1; MG/1
1 TABLET ORAL EVERY 4 HOURS PRN
Qty: 15 TABLET | Refills: 0 | Status: SHIPPED | OUTPATIENT
Start: 2025-02-24

## 2025-02-24 RX ORDER — ACETAMINOPHEN 500 MG
1000 TABLET ORAL
Status: COMPLETED | OUTPATIENT
Start: 2025-02-24 | End: 2025-02-24

## 2025-02-24 RX ORDER — HYDROMORPHONE HYDROCHLORIDE 2 MG/ML
0.4 INJECTION, SOLUTION INTRAMUSCULAR; INTRAVENOUS; SUBCUTANEOUS EVERY 5 MIN PRN
Status: DISCONTINUED | OUTPATIENT
Start: 2025-02-24 | End: 2025-02-24 | Stop reason: HOSPADM

## 2025-02-24 RX ADMIN — ACETAMINOPHEN 1000 MG: 500 TABLET, FILM COATED ORAL at 06:02

## 2025-02-24 RX ADMIN — SODIUM CHLORIDE: 0.9 INJECTION, SOLUTION INTRAVENOUS at 07:02

## 2025-02-24 RX ADMIN — CEFAZOLIN 2 G: 2 INJECTION, POWDER, FOR SOLUTION INTRAMUSCULAR; INTRAVENOUS at 07:02

## 2025-02-24 RX ADMIN — OXYCODONE HYDROCHLORIDE 5 MG: 5 TABLET ORAL at 09:02

## 2025-02-24 RX ADMIN — MIDAZOLAM HYDROCHLORIDE 2 MG: 1 INJECTION, SOLUTION INTRAMUSCULAR; INTRAVENOUS at 07:02

## 2025-02-24 RX ADMIN — ONDANSETRON 4 MG: 2 INJECTION INTRAMUSCULAR; INTRAVENOUS at 08:02

## 2025-02-24 RX ADMIN — LIDOCAINE HYDROCHLORIDE 60 MG: 20 INJECTION, SOLUTION INTRAVENOUS at 07:02

## 2025-02-24 RX ADMIN — PROPOFOL 200 MG: 10 INJECTION, EMULSION INTRAVENOUS at 07:02

## 2025-02-24 RX ADMIN — FENTANYL CITRATE 100 MCG: 50 INJECTION, SOLUTION INTRAMUSCULAR; INTRAVENOUS at 07:02

## 2025-02-24 RX ADMIN — GLYCOPYRROLATE 0.2 MG: 0.2 INJECTION, SOLUTION INTRAMUSCULAR; INTRAVITREAL at 07:02

## 2025-02-24 RX ADMIN — DEXAMETHASONE SODIUM PHOSPHATE 4 MG: 4 INJECTION, SOLUTION INTRAMUSCULAR; INTRAVENOUS at 07:02

## 2025-02-24 NOTE — BRIEF OP NOTE
[facility] - Surgery ([place])  Brief Operative Note    Surgery Date: 2/24/2025     Surgeons and Role:     * Fran Fuller MD - Primary    Assisting Surgeon: None    Pre-op Diagnosis:  Right inguinal hernia [K40.90]    Post-op Diagnosis:  Post-Op Diagnosis Codes:     * Right inguinal hernia [K40.90]    Procedure(s) (LRB):  REPAIR, HERNIA, INGUINAL, WITHOUT HISTORY OF PRIOR REPAIR, AGE 5 YEARS OR OLDER (Right)    Anesthesia: General    Operative Findings:  Direct inguinal    Estimated Blood Loss: * No values recorded between 2/24/2025  7:25 AM and 2/24/2025  8:22 AM *         Specimens:   Specimen (24h ago, onward)      None              Discharge Note    OUTCOME: Patient tolerated treatment/procedure well without complication and is now ready for discharge.    DISPOSITION: Home or Self Care    FINAL DIAGNOSIS:  Non-recurrent inguinal hernia of right side without obstruction or gangrene    FOLLOWUP: In clinic 2-3 weeks    DISCHARGE INSTRUCTIONS:    Discharge Procedure Orders   Diet general     Lifting restrictions   Order Comments: Strenuous activity and No lifting greater than 15 lb for 6 weeks     No dressing needed   Order Comments: Do not remove Steri-Strips.  Patient may shower in 2 days without covering dressings.     Call MD for:  temperature >100.4     Call MD for:  persistent nausea and vomiting     Call MD for:  severe uncontrolled pain     Call MD for:  difficulty breathing, headache or visual disturbances     Call MD for:  redness, tenderness, or signs of infection (pain, swelling, redness, odor or green/yellow discharge around incision site)

## 2025-02-24 NOTE — ANESTHESIA POSTPROCEDURE EVALUATION
Anesthesia Post Evaluation    Patient: Bob Trevino JrGerald    Procedure(s) Performed: Procedure(s) (LRB):  REPAIR, HERNIA, INGUINAL, WITHOUT HISTORY OF PRIOR REPAIR, AGE 5 YEARS OR OLDER (Right)    Final Anesthesia Type: general      Patient location during evaluation: PACU  Patient participation: Yes- Able to Participate  Level of consciousness: awake and alert  Post-procedure vital signs: reviewed and stable  Pain management: adequate  Airway patency: patent  CHAUNCEY mitigation strategies: Extubation while patient is awake  PONV status at discharge: No PONV  Anesthetic complications: no      Cardiovascular status: hemodynamically stable  Respiratory status: unassisted  Hydration status: euvolemic  Follow-up not needed.              Vitals Value Taken Time   /76 02/24/25 09:56   Temp 36.4 °C (97.5 °F) 02/24/25 09:26   Pulse 61 02/24/25 09:56   Resp 16 02/24/25 09:56   SpO2 98 % 02/24/25 09:56         Event Time   Out of Recovery 09:22:00         Pain/Frnacie Score: Pain Rating Prior to Med Admin: 0 (2/24/2025  9:01 AM)  Francie Score: 10 (2/24/2025  9:56 AM)

## 2025-02-24 NOTE — H&P
History & Physical     SUBJECTIVE:      History of Present Illness:  Patient is a 64 y.o. male referred for evaluation of right inguinal hernia.  Patient noticed a bulge in the right groin some time after lifting several heavy objects.  Occasional pain when doing these activities.  He also has some bulging and pain with coughing.  No pain or bulge on the left     2025 Patient has arrived today for scheduled inguinal hernia repair and has no further questions about the operation    Review of patient's allergies indicates:  No Known Allergies     Current Medications          Current Outpatient Medications   Medication Sig Dispense Refill    amoxicillin (AMOXIL) 500 MG capsule Take 500 mg by mouth 4 (four) times daily.        pantoprazole (PROTONIX) 40 MG tablet TAKE 1 TABLET(40 MG) BY MOUTH EVERY DAY 90 tablet 0      No current facility-administered medications for this visit.                Facility-Administered Medications Ordered in Other Visits   Medication Dose Route Frequency Provider Last Rate Last Admin    0.9%  NaCl infusion   Intravenous Continuous Sly Bhatt MD        LIDOcaine (PF) 10 mg/ml (1%) injection 10 mg  1 mL Intradermal Once PRN Sly Bhatt MD                History reviewed. No pertinent past medical history.        Past Surgical History:   Procedure Laterality Date    COLONOSCOPY N/A 2023     Procedure: COLONOSCOPY;  Surgeon: Sly Bhatt MD;  Location: [facility];  Service: Endoscopy;  Laterality: N/A;    ESOPHAGOGASTRODUODENOSCOPY N/A 2024     Procedure: EGD (ESOPHAGOGASTRODUODENOSCOPY);  Surgeon: Layton Kahn MD;  Location: [address]);  Service: Endoscopy;  Laterality: N/A;  Ref By: Ivanna López  Procedure: EGD  Diagnosis: Abdominal pain, GERD, Weight loss  Procedure Timin-12  Provider: Dr Kahn  Location: [facility] [place] [facility]o  24- LVM for precall - ERW  -precall complete-KPvt  -pt notified of earlier arrival t  "     No family history on file.  Social History   Social History           Tobacco Use    Smoking status: Never   Substance Use Topics    Alcohol use: Yes            Review of Systems:  Review of Systems   Constitutional:  Negative for appetite change, fatigue, fever and unexpected weight change.   HENT:  Negative for sore throat and trouble swallowing.    Eyes: Negative.    Respiratory:  Negative for cough, shortness of breath and wheezing.    Cardiovascular:  Negative for chest pain and leg swelling.   Gastrointestinal:  Negative for abdominal distention, abdominal pain, blood in stool, constipation, diarrhea, nausea and vomiting.   Endocrine: Negative.    Genitourinary: Negative.    Musculoskeletal:  Negative for back pain.   Skin: Negative.  Negative for rash.   Allergic/Immunologic: Negative.    Neurological: Negative.    Hematological: Negative.    Psychiatric/Behavioral:  Negative for confusion.          OBJECTIVE:      Vital Signs (Most Recent)  Pulse: 89 (01/14/25 0919)  BP: 110/68 (01/14/25 0919)  SpO2: 99 % (01/14/25 0919)  5' 9" (1.753 m)  63.5 kg (140 lb)      Physical Exam:  Physical Exam  Constitutional:       General: He is not in acute distress.     Appearance: Normal appearance.   Eyes:      General: No scleral icterus.  Cardiovascular:      Rate and Rhythm: Normal rate and regular rhythm.   Pulmonary:      Breath sounds: Normal breath sounds.   Abdominal:      General: There is no distension.      Palpations: Abdomen is soft.      Tenderness: There is no abdominal tenderness.      Comments: Small reducible bulge exiting the right external ring.  No bulge at the left external ring or bilateral femoral spaces even with Valsalva standing   Musculoskeletal:         General: Normal range of motion.      Cervical back: Neck supple.   Lymphadenopathy:      Cervical: No cervical adenopathy.   Skin:     General: Skin is warm and dry.      Findings: No rash.   Neurological:      General: No focal deficit " present.      Mental Status: He is alert.   Psychiatric:         Mood and Affect: Mood normal.            Laboratory        Diagnostic Results:        ASSESSMENT/PLAN:      Reducible right inguinal hernia, initial     PLAN:  Plan  Open inguinal hernia repair with mesh     We also discussed robotic repair with mesh.  After discussion, patient thinks that that procedure sounds to involved in prefers an open repair with mesh.     The general risks of the operation were described to the patient in detail and included on an informed consent sheet, which I reviewed with the patientand which the patient has signed.  Illustrations were provided for clarity.  The patient was offered the opportunity to ask questions, on multiple occasions, and after continued discussion had none additional.  The patient is ready to proceed with the operation.  He wishes to proceed February 27th. Call the office or report to the emergency department if symptoms worsen prior to the operation

## 2025-02-24 NOTE — PLAN OF CARE
Bob Trevino Jr. has met all discharge criteria from Phase II. Vital Signs are stable, ambulating  without difficulty. Discharge instructions given, patient verbalized understanding. Discharged from facility via wheelchair in stable condition.

## 2025-02-24 NOTE — OP NOTE
Operative Note     Surgery Date: 2/24/2025      Surgeons and Role:     * Fran Fuller MD - Primary     Assisting Surgeon: None     Pre-op Diagnosis:  Right inguinal hernia [K40.90]     Post-op Diagnosis:  Post-Op Diagnosis Codes:     * Right inguinal hernia [K40.90]     Procedure(s) (LRB):  REPAIR, HERNIA, INGUINAL, WITHOUT HISTORY OF PRIOR REPAIR, AGE 5 YEARS OR OLDER (Right)     Complication:  None  Drain:  None  Anesthesia: General LMA     Operative Findings:  Direct inguinal     Estimated Blood Loss: * No values recorded between 2/24/2025  7:25 AM and 2/24/2025  8:22 AM *         Specimens:   Specimen (24h ago, onward)        None            I identified the patient and marked the correct site in Preoperative Holding and  brought back to the Operating Room. Placed supine on the operating table and padded appropriately. Monitors were applied and IV sedation was continued.  The patient's abdomen was prepped and draped in the standard sterile surgical fashion. A time-out was performed and all team members present agreed this was the correct procedure on the correct patient. We also confirmed administration of appropriate preoperative antibiotics.    Local anesthesia was injected at the incision site and in the layers during the case..  Incision is made in the right lower quadrant directly over the right inguinal canal.  It is taken down through subcutaneous fat and Kailee's fascia with electrocautery.  External oblique aponeurosis was exposed and opened for 5 mm.  The underside is then cleared and it is then opened down through the external ring with Metzenbaum scissors.  Blunt dissection was used to separate the surrounding tissue from the external oblique.  Ilioinguinal and iliohypogastric nerves are both identified for their course.  They are preserved and protected throughout the case.  Shelving edge of the inguinal ligament is exposed as well as the pubic tubercle.  Spermatic cord is dissected bluntly and  encircled with a Penrose drain at the level of tubercle.  Direct space has fat bulging through..  Blunt dissection on the anterior medial portion of the cord reveals no sac through the indirect space .  There were  adhesions to the spermatic cord.  The adhesions are taken down with blunt dissection and occasional cautery.  The direct fat is cleared from the edges in the defect in the direct space and returned to the abdomen.  This defect in the transversalis is closed with a running 3-0 Vicryl away the neurovascular structures.  Right ProGrip mesh is selected and deployed.  The flap is wrapped around the cord and back onto the medial portion of the mesh.  This recreates the internal ring which is reinforced with Prolene sutures.  Additional Prolene interrupted sutures placed at the pubic tubercle securing the mesh.  A few other interrupted were placed along the shelving edge of the inguinal ligament.  Several other Vicryl interrupted were placed over the direct space and immediately on the rectus and superior and laterally on the internal oblique.  The newly formed internal ring  accommodates the cord and the tip of the surgeon's finger appropriately without congestion of the vasculature.  Hemostasis is excellent.  External oblique aponeurosis is closed with a running 2-0 Vicryl suture.  Kailee's fascia was closed with the running 3-0 Vicryl suture.  Dermis and skin is closed in subcuticular fashion with a running 3-0 Stratafix.  Mastisol and Steri-Strips were placed.

## 2025-02-24 NOTE — ANESTHESIA PROCEDURE NOTES
Intubation    Date/Time: 2/24/2025 7:14 AM    Performed by: Gladis Calero CRNA  Authorized by: Storm Pierce MD    Intubation:     Induction:  Intravenous    Intubated:  Postinduction    Mask Ventilation:  Easy mask    Attempts:  1    Attempted By:  CRNA    Difficult Airway Encountered?: No      Complications:  None    Airway Device:  Supraglottic airway/LMA    Airway Device Size:  5.0    Placement Verified By:  Capnometry    Complicating Factors:  None    Findings Post-Intubation:  Atraumatic/condition of teeth unchanged and BS equal bilateral

## 2025-02-24 NOTE — TRANSFER OF CARE
"Anesthesia Transfer of Care Note    Patient: Bob Trevino Jr.    Procedure(s) Performed: Procedure(s) (LRB):  REPAIR, HERNIA, INGUINAL, WITHOUT HISTORY OF PRIOR REPAIR, AGE 4 YEARS OR OLDER (Right)    Patient location: PACU    Anesthesia Type: general    Transport from OR: Transported from OR on 6-10 L/min O2 by face mask with adequate spontaneous ventilation    Post pain: adequate analgesia    Post assessment: no apparent anesthetic complications and tolerated procedure well    Post vital signs: stable    Level of consciousness: responds to stimulation    Nausea/Vomiting: no nausea/vomiting    Complications: none    Transfer of care protocol was followed      Last vitals: Visit Vitals  /67 (BP Location: Right arm, Patient Position: Lying)   Pulse (!) 58   Temp 36.1 °C (97 °F) (Temporal)   Resp 14   Ht 5' 9" (1.753 m)   Wt 65.8 kg (145 lb)   SpO2 100%   BMI 21.41 kg/m²     "

## 2025-02-25 ENCOUNTER — PATIENT MESSAGE (OUTPATIENT)
Dept: SURGERY | Facility: CLINIC | Age: 65
End: 2025-02-25
Payer: COMMERCIAL

## 2025-02-25 ENCOUNTER — PATIENT MESSAGE (OUTPATIENT)
Dept: GASTROENTEROLOGY | Facility: CLINIC | Age: 65
End: 2025-02-25
Payer: COMMERCIAL

## 2025-02-27 ENCOUNTER — TELEPHONE (OUTPATIENT)
Dept: SURGERY | Facility: CLINIC | Age: 65
End: 2025-02-27
Payer: COMMERCIAL

## 2025-02-27 NOTE — TELEPHONE ENCOUNTER
Phone visit yesterday afternoon    Patient was initially concerned about postoperative pain which started after local began to wear off.  Hurts worst when he sneezed and coughed.  When he rests he does not have any baseline pain.  He is feeling better.  He has no further questions.  Call back if you have any further concerns

## 2025-03-11 ENCOUNTER — OFFICE VISIT (OUTPATIENT)
Dept: SURGERY | Facility: CLINIC | Age: 65
End: 2025-03-11
Payer: COMMERCIAL

## 2025-03-11 VITALS — OXYGEN SATURATION: 99 % | HEART RATE: 74 BPM | DIASTOLIC BLOOD PRESSURE: 72 MMHG | SYSTOLIC BLOOD PRESSURE: 118 MMHG

## 2025-03-11 DIAGNOSIS — K40.90 NON-RECURRENT INGUINAL HERNIA OF RIGHT SIDE WITHOUT OBSTRUCTION OR GANGRENE: ICD-10-CM

## 2025-03-11 DIAGNOSIS — Z87.19 STATUS POST RIGHT INGUINAL HERNIORRHAPHY: Primary | ICD-10-CM

## 2025-03-11 DIAGNOSIS — Z98.890 STATUS POST RIGHT INGUINAL HERNIORRHAPHY: Primary | ICD-10-CM

## 2025-03-11 PROCEDURE — 99999 PR PBB SHADOW E&M-EST. PATIENT-LVL III: CPT | Mod: PBBFAC,,, | Performed by: SURGERY

## 2025-03-11 NOTE — PROGRESS NOTES
HPI:     Still reports some pain with movement.  This has been improving over the past 2 weeks.  Minimal small patch of numbness just below the incision    PHYSICAL EXAM:  Physical Exam     In NAD    Right inguinal Incision healed.  No seroma or erythema.  No bulge at the external ring      ASSESSMENT:    The patient is doing well 2 weeks status post open right inguinal hernia repair with mesh .  No sign of recurrence.  Although he still has some pain with certain movements overall this is decreasing in intensity and duration           PLAN:    Return to the office in 1 month for another examination or sooner if pain intensifies .  He is happy to wait as he is improving.

## 2025-04-17 ENCOUNTER — OFFICE VISIT (OUTPATIENT)
Dept: SURGERY | Facility: CLINIC | Age: 65
End: 2025-04-17
Payer: COMMERCIAL

## 2025-04-17 VITALS — SYSTOLIC BLOOD PRESSURE: 118 MMHG | DIASTOLIC BLOOD PRESSURE: 75 MMHG | OXYGEN SATURATION: 99 % | HEART RATE: 68 BPM

## 2025-04-17 DIAGNOSIS — Z98.890 STATUS POST RIGHT INGUINAL HERNIORRHAPHY: Primary | ICD-10-CM

## 2025-04-17 DIAGNOSIS — Z87.19 STATUS POST RIGHT INGUINAL HERNIORRHAPHY: Primary | ICD-10-CM

## 2025-04-17 PROCEDURE — 99999 PR PBB SHADOW E&M-EST. PATIENT-LVL III: CPT | Mod: PBBFAC,,, | Performed by: SURGERY

## 2025-04-17 NOTE — PROGRESS NOTES
HPI:     No pain or bulge currently    PHYSICAL EXAM:  Physical Exam     In NAD    Incision healed.  No bulge at the right external ring      ASSESSMENT:    The patient is doing well 2 months status post open right inguinal hernia repair with mesh .            PLAN:    Make gradual return to regular activity.  To the office as needed

## 2025-06-06 ENCOUNTER — TELEPHONE (OUTPATIENT)
Dept: INTERNAL MEDICINE | Facility: CLINIC | Age: 65
End: 2025-06-06
Payer: COMMERCIAL

## (undated) DEVICE — ELECTRODE BLD EXT INSUL 1

## (undated) DEVICE — TOWEL OR NONABSORB ADH 17X26

## (undated) DEVICE — APPLICATOR CHLORAPREP ORN 26ML

## (undated) DEVICE — ELECTRODE REM PLYHSV RETURN 9

## (undated) DEVICE — DRESSING TRANS 4X4 TEGADERM

## (undated) DEVICE — DRAIN PENRS SIL STRL .25X18IN

## (undated) DEVICE — SPONGE KITTNER 1/4X 5/8 L STRL

## (undated) DEVICE — NDL HYPO REG 25G X 1 1/2

## (undated) DEVICE — SUT STRATAFIX PGAPCL 3 FS-2

## (undated) DEVICE — ELECTRODE BLADE TEFLON 6

## (undated) DEVICE — CLOSURE SKIN STERI STRIP 1/2X4

## (undated) DEVICE — PENCIL ELECTROSURG HOLST W/BLD

## (undated) DEVICE — DRAPE LAP T SHT W/ INSTR PAD

## (undated) DEVICE — ADHESIVE MASTISOL VIAL 48/BX

## (undated) DEVICE — SUT VICRYL PLUS 3-0 SH 18IN

## (undated) DEVICE — GAUZE SPONGE 4X4 12PLY

## (undated) DEVICE — SYR B-D DISP CONTROL 10CC100/C

## (undated) DEVICE — DRAPE UTILITY STRL 15X26IN

## (undated) DEVICE — SUT PROLENE 2-0 SH 36IN BLU

## (undated) DEVICE — SPONGE LAP 18X18 PREWASHED

## (undated) DEVICE — TIP YANKAUERS BULB NO VENT

## (undated) DEVICE — Device

## (undated) DEVICE — GLOVE SENSICARE PI SURG 7.5